# Patient Record
Sex: FEMALE | Race: WHITE | Employment: FULL TIME | URBAN - METROPOLITAN AREA
[De-identification: names, ages, dates, MRNs, and addresses within clinical notes are randomized per-mention and may not be internally consistent; named-entity substitution may affect disease eponyms.]

---

## 2020-12-15 ENCOUNTER — APPOINTMENT (OUTPATIENT)
Dept: CT IMAGING | Age: 65
DRG: 066 | End: 2020-12-15
Attending: EMERGENCY MEDICINE
Payer: MEDICARE

## 2020-12-15 ENCOUNTER — HOSPITAL ENCOUNTER (INPATIENT)
Age: 65
LOS: 1 days | Discharge: HOME | DRG: 066 | End: 2020-12-16
Attending: EMERGENCY MEDICINE | Admitting: INTERNAL MEDICINE
Payer: MEDICARE

## 2020-12-15 ENCOUNTER — APPOINTMENT (OUTPATIENT)
Dept: MRI IMAGING | Age: 65
DRG: 066 | End: 2020-12-15
Attending: INTERNAL MEDICINE
Payer: MEDICARE

## 2020-12-15 DIAGNOSIS — I10 ESSENTIAL HYPERTENSION: ICD-10-CM

## 2020-12-15 DIAGNOSIS — E78.5 HYPERLIPIDEMIA, UNSPECIFIED HYPERLIPIDEMIA TYPE: ICD-10-CM

## 2020-12-15 DIAGNOSIS — I63.9 CEREBROVASCULAR ACCIDENT (CVA), UNSPECIFIED MECHANISM (HCC): ICD-10-CM

## 2020-12-15 DIAGNOSIS — R20.0 LEFT SIDED NUMBNESS: ICD-10-CM

## 2020-12-15 DIAGNOSIS — I63.10 CEREBROVASCULAR ACCIDENT (CVA) DUE TO EMBOLISM OF PRECEREBRAL ARTERY (HCC): Primary | ICD-10-CM

## 2020-12-15 LAB
ALBUMIN SERPL-MCNC: 3.8 G/DL (ref 3.5–5)
ALBUMIN/GLOB SERPL: 1.1 {RATIO} (ref 1.1–2.2)
ALP SERPL-CCNC: 60 U/L (ref 45–117)
ALT SERPL-CCNC: 20 U/L (ref 12–78)
ANION GAP SERPL CALC-SCNC: 4 MMOL/L (ref 5–15)
APTT PPP: 24.8 SEC (ref 22.1–31)
AST SERPL-CCNC: 15 U/L (ref 15–37)
BASOPHILS # BLD: 0 K/UL (ref 0–0.1)
BASOPHILS NFR BLD: 0 % (ref 0–1)
BILIRUB SERPL-MCNC: 0.3 MG/DL (ref 0.2–1)
BUN SERPL-MCNC: 19 MG/DL (ref 6–20)
BUN/CREAT SERPL: 17 (ref 12–20)
CALCIUM SERPL-MCNC: 9 MG/DL (ref 8.5–10.1)
CHLORIDE SERPL-SCNC: 109 MMOL/L (ref 97–108)
CO2 SERPL-SCNC: 27 MMOL/L (ref 21–32)
COMMENT, HOLDF: NORMAL
CREAT SERPL-MCNC: 1.13 MG/DL (ref 0.55–1.02)
DIFFERENTIAL METHOD BLD: NORMAL
EOSINOPHIL # BLD: 0.1 K/UL (ref 0–0.4)
EOSINOPHIL NFR BLD: 1 % (ref 0–7)
ERYTHROCYTE [DISTWIDTH] IN BLOOD BY AUTOMATED COUNT: 12.8 % (ref 11.5–14.5)
GLOBULIN SER CALC-MCNC: 3.4 G/DL (ref 2–4)
GLUCOSE BLD STRIP.AUTO-MCNC: 131 MG/DL (ref 65–100)
GLUCOSE SERPL-MCNC: 133 MG/DL (ref 65–100)
HCT VFR BLD AUTO: 40.3 % (ref 35–47)
HGB BLD-MCNC: 13.2 G/DL (ref 11.5–16)
IMM GRANULOCYTES # BLD AUTO: 0 K/UL (ref 0–0.04)
IMM GRANULOCYTES NFR BLD AUTO: 0 % (ref 0–0.5)
INR PPP: 1 (ref 0.9–1.1)
LYMPHOCYTES # BLD: 1.9 K/UL (ref 0.8–3.5)
LYMPHOCYTES NFR BLD: 26 % (ref 12–49)
MCH RBC QN AUTO: 30.1 PG (ref 26–34)
MCHC RBC AUTO-ENTMCNC: 32.8 G/DL (ref 30–36.5)
MCV RBC AUTO: 91.8 FL (ref 80–99)
MONOCYTES # BLD: 0.6 K/UL (ref 0–1)
MONOCYTES NFR BLD: 7 % (ref 5–13)
NEUTS SEG # BLD: 4.9 K/UL (ref 1.8–8)
NEUTS SEG NFR BLD: 66 % (ref 32–75)
NRBC # BLD: 0 K/UL (ref 0–0.01)
NRBC BLD-RTO: 0 PER 100 WBC
PLATELET # BLD AUTO: 279 K/UL (ref 150–400)
PMV BLD AUTO: 9.4 FL (ref 8.9–12.9)
POTASSIUM SERPL-SCNC: 4 MMOL/L (ref 3.5–5.1)
PROT SERPL-MCNC: 7.2 G/DL (ref 6.4–8.2)
PROTHROMBIN TIME: 10.1 SEC (ref 9–11.1)
RBC # BLD AUTO: 4.39 M/UL (ref 3.8–5.2)
SAMPLES BEING HELD,HOLD: NORMAL
SERVICE CMNT-IMP: ABNORMAL
SODIUM SERPL-SCNC: 140 MMOL/L (ref 136–145)
THERAPEUTIC RANGE,PTTT: NORMAL SECS (ref 58–77)
TROPONIN I SERPL-MCNC: <0.05 NG/ML
WBC # BLD AUTO: 7.5 K/UL (ref 3.6–11)

## 2020-12-15 PROCEDURE — 70450 CT HEAD/BRAIN W/O DYE: CPT

## 2020-12-15 PROCEDURE — 74011250637 HC RX REV CODE- 250/637: Performed by: INTERNAL MEDICINE

## 2020-12-15 PROCEDURE — 74011250637 HC RX REV CODE- 250/637: Performed by: NURSE PRACTITIONER

## 2020-12-15 PROCEDURE — 85730 THROMBOPLASTIN TIME PARTIAL: CPT

## 2020-12-15 PROCEDURE — 70498 CT ANGIOGRAPHY NECK: CPT

## 2020-12-15 PROCEDURE — 93005 ELECTROCARDIOGRAM TRACING: CPT

## 2020-12-15 PROCEDURE — 70553 MRI BRAIN STEM W/O & W/DYE: CPT

## 2020-12-15 PROCEDURE — 99218 HC RM OBSERVATION: CPT

## 2020-12-15 PROCEDURE — 80053 COMPREHEN METABOLIC PANEL: CPT

## 2020-12-15 PROCEDURE — 0042T CT CODE NEURO PERF W CBF: CPT

## 2020-12-15 PROCEDURE — 85610 PROTHROMBIN TIME: CPT

## 2020-12-15 PROCEDURE — 82962 GLUCOSE BLOOD TEST: CPT

## 2020-12-15 PROCEDURE — 4A03X5D MEASUREMENT OF ARTERIAL FLOW, INTRACRANIAL, EXTERNAL APPROACH: ICD-10-PCS | Performed by: RADIOLOGY

## 2020-12-15 PROCEDURE — A9575 INJ GADOTERATE MEGLUMI 0.1ML: HCPCS | Performed by: INTERNAL MEDICINE

## 2020-12-15 PROCEDURE — 74011250636 HC RX REV CODE- 250/636: Performed by: INTERNAL MEDICINE

## 2020-12-15 PROCEDURE — 74011000636 HC RX REV CODE- 636: Performed by: RADIOLOGY

## 2020-12-15 PROCEDURE — 99285 EMERGENCY DEPT VISIT HI MDM: CPT

## 2020-12-15 PROCEDURE — 36415 COLL VENOUS BLD VENIPUNCTURE: CPT

## 2020-12-15 PROCEDURE — 85025 COMPLETE CBC W/AUTO DIFF WBC: CPT

## 2020-12-15 PROCEDURE — 84484 ASSAY OF TROPONIN QUANT: CPT

## 2020-12-15 RX ORDER — FLUTICASONE PROPIONATE 50 MCG
2 SPRAY, SUSPENSION (ML) NASAL DAILY
Status: DISCONTINUED | OUTPATIENT
Start: 2020-12-15 | End: 2020-12-16 | Stop reason: HOSPADM

## 2020-12-15 RX ORDER — ACETAMINOPHEN 650 MG/1
650 SUPPOSITORY RECTAL
Status: DISCONTINUED | OUTPATIENT
Start: 2020-12-15 | End: 2020-12-16 | Stop reason: HOSPADM

## 2020-12-15 RX ORDER — GADOTERATE MEGLUMINE 376.9 MG/ML
15 INJECTION INTRAVENOUS
Status: COMPLETED | OUTPATIENT
Start: 2020-12-15 | End: 2020-12-15

## 2020-12-15 RX ORDER — BISACODYL 5 MG
5 TABLET, DELAYED RELEASE (ENTERIC COATED) ORAL DAILY PRN
Status: DISCONTINUED | OUTPATIENT
Start: 2020-12-15 | End: 2020-12-16 | Stop reason: HOSPADM

## 2020-12-15 RX ORDER — ATORVASTATIN CALCIUM 40 MG/1
40 TABLET, FILM COATED ORAL
Status: DISCONTINUED | OUTPATIENT
Start: 2020-12-15 | End: 2020-12-16 | Stop reason: HOSPADM

## 2020-12-15 RX ORDER — GUAIFENESIN 100 MG/5ML
81 LIQUID (ML) ORAL DAILY
Status: DISCONTINUED | OUTPATIENT
Start: 2020-12-16 | End: 2020-12-16 | Stop reason: HOSPADM

## 2020-12-15 RX ORDER — ACETAMINOPHEN 325 MG/1
650 TABLET ORAL
Status: DISCONTINUED | OUTPATIENT
Start: 2020-12-15 | End: 2020-12-16 | Stop reason: HOSPADM

## 2020-12-15 RX ADMIN — GADOTERATE MEGLUMINE 15 ML: 376.9 INJECTION INTRAVENOUS at 19:48

## 2020-12-15 RX ADMIN — ATORVASTATIN CALCIUM 40 MG: 40 TABLET, FILM COATED ORAL at 22:13

## 2020-12-15 RX ADMIN — IOPAMIDOL 120 ML: 755 INJECTION, SOLUTION INTRAVENOUS at 14:19

## 2020-12-15 RX ADMIN — FLUTICASONE PROPIONATE 2 SPRAY: 50 SPRAY, METERED NASAL at 23:01

## 2020-12-15 NOTE — ED TRIAGE NOTES
Triage: pt arrives ambulatory from home with CC of numbness to her left arm, leg, and the left side of her face. Denies issues with vision or speech. Gait steady. Pt took an aspirin PTA.

## 2020-12-15 NOTE — PROGRESS NOTES
Neurocritical Care Code Stroke Documentation    Symptoms:   Numbness to left arm, leg, and the left side of her face. Denies issues with vision or speech. Reports leg and face appear to be getting better but arm is still numb,    Last Known Well:   36   Medical hx:   HTN   Anticoagulation:  None   VAN:   Negative   NIHSS:   1a-LOC:0    1b-Month/Age:0    1c-Open/Close Hand:0    2-Best Gaze:0    3-Visual Fields:0    4-Facial Palsy:0    5a-Left Arm:0    5b-Right Arm:0    6a-Left Le    6b-Right Le    7-Limb Ataxia:0    8-Sensory:2    9-Best Language:0    10-Dysarthria:0    11-Extinction/Inattention:0  TOTAL SCORE:2   Imaging:   CT head negative for acute process    CTA negative for LVO   Plan:   TPA Candidate: NO     Discussed with: Dr. Briseida Bustamante    Time spent: 30 minutes.      Marcelene Lombard, NP  Neurocritical Care Nurse Practitioner  [phone]

## 2020-12-15 NOTE — PROGRESS NOTES
Spiritual Care Assessment/Progress Note  [facility]      NAME: Reginald Palomares      MRN: [id number]  AGE: 76 y.o. SEX: female  Lutheran Affiliation: Pentecostal   Language: English     12/15/2020           Spiritual Assessment begun in [address] DEP through conversation with:         []Patient        [] Family    [] Friend(s)        Reason for Consult: Crisis     Spiritual beliefs: (Please include comment if needed)     [] Identifies with a samantha tradition:         [] Supported by a samantha community:            [] Claims no spiritual orientation:           [] Seeking spiritual identity:                [] Adheres to an individual form of spirituality:           [x] Not able to assess:                           Identified resources for coping:      [] Prayer                               [] Music                  [] Guided Imagery     [] Family/friends                 [] Pet visits     [] Devotional reading                         [] Unknown     [] Other:                                               Interventions offered during this visit: (See comments for more details)    Patient Interventions: Crisis           Plan of Care:     [] Support spiritual and/or cultural needs    [] Support AMD and/or advance care planning process      [] Support grieving process   [] Coordinate Rites and/or Rituals    [] Coordination with community clergy   [] No spiritual needs identified at this time   [] Detailed Plan of Care below (See Comments)  [] Make referral to Music Therapy  [] Make referral to Pet Therapy     [] Make referral to Addiction services  [] Make referral to [facility]  [] Make referral to Spiritual Care Partner  [] No future visits requested        [] Follow up upon further referrals     Comments: Responded to Code Stroke called for Ms Michelle Hand in ED-14. Tele Neuro was talking with patient and no family was present at time  arrived. Please notify chaplains if support desired/needed.   : Odilon Salomon.  Jennifer Matthew; [facility], to contact [phone] call: [phone]

## 2020-12-15 NOTE — PROGRESS NOTES
Spiritual Care Assessment/Progress Note  [facility]      NAME: Mary Jane Kevin      MRN: [id number]  AGE: 76 y.o.  SEX: female  U[place] Affiliation: Confucianist   Language: English     12/15/2020     Total Time (in minutes): 13     Spiritual Assessment begun in [address] through conversation with:         [x]Patient        [] Family    [] Friend(s)        Reason for Consult: Emergency Department visit     Spiritual beliefs: (Please include comment if needed)     [x] Identifies with a samantha tradition: [address]        [] Supported by a samantha community:            [] Claims no spiritual orientation:           [] Seeking spiritual identity:                [] Adheres to an individual form of spirituality:           [] Not able to assess:                           Identified resources for coping:      [x] Prayer                               [] Music                  [] Guided Imagery     [x] Family/friends                 [] Pet visits     [] Devotional reading                         [] Unknown     [] Other:                                               Interventions offered during this visit: (See comments for more details)    Patient Interventions: Affirmation of emotions/emotional suffering, Catharsis/review of pertinent events in supportive environment, Initial/Spiritual assessment, patient floor, Prayer (assurance of)           Plan of Care:     [x] Support spiritual and/or cultural needs    [] Support AMD and/or advance care planning process      [] Support grieving process   [] Coordinate Rites and/or Rituals    [] Coordination with community clergy   [] No spiritual needs identified at this time   [] Detailed Plan of Care below (See Comments)  [] Make referral to Music Therapy  [] Make referral to Pet Therapy     [] Make referral to Addiction services  [] Make referral to [facility]  [] Make referral to Spiritual Care Partner  [] No future visits requested        [x] Follow up upon further referrals     Comments: Visited Ms Alyssia Gomez in ED-14; patient is familiar to this  as she is an [facility] at the [employer] this  attends. Offered active listening as Jose Hamilton shared about the events that led up to her hospitalization. Provided safe space as she attempted to process her thoughts and feelings. Acknowledged her concerns and offered words of support. Jose Hamilton is an  at [employer] at [place] and very involved in [employer] Assured her of prayers on her behalf and of ongoing  availability for support. : Rev. Buckley Olszewski.  [employer]; [facility], to contact [phone] [address] call: [phone]

## 2020-12-15 NOTE — ROUTINE PROCESS
TRANSFER - OUT REPORT: 
 
[facility] [address]  being transferred to 361(unit) for routine progression of care Report consisted of patients Situation, Background, Assessment and  
Recommendations(SBAR). Information from the following report(s) SBAR, Kardex, ED Summary, Intake/Output and Recent Results was reviewed with the receiving nurse. Lines:  
Peripheral IV 12/15/20 Right Antecubital (Active) Site Assessment Clean, dry, & intact 12/15/20 1336 Phlebitis Assessment 0 12/15/20 1336 Infiltration Assessment 0 12/15/20 1336 Dressing Status Clean, dry, & intact 12/15/20 1336 Opportunity for questions and clarification was provided. Patient transported with: 
 [email or web address]

## 2020-12-15 NOTE — H&P
[address] [facility] Adult  Hospitalist Group  History and Physical    Primary Care Provider: Satish Enriquez MD  Date of Service:  12/15/2020    CC: left sided numbness     Subjective:     77-year-old female with past medical history hypertension, hyperlipidemia, presenting to [facility] with acute onset left-sided numbness. Per report, patient was at work when she had sudden onset numbness in the left side of her face, left elbow/arm. Denies weakness, facial droop, slurred speech. She took a baby aspirin. She presented to the emergency room department for further evaluation. During initial evaluation, her symptoms were resolving. Code stroke was initiated and patient was evaluated by teleneurology. CTA negative for acute large vessel occlusion or flow-limiting stenosis. CT head demonstrated small focus of diminished attenuation in right frontal lobe, consistent with acute versus chronic infarct. Also demonstrated chronic infarct in the inferior left cerebellar hemisphere. Hospitalist medicine was consulted for further evaluation/admission. During my encounter, patient symptoms had resolved. She was requesting possible discharge with return for outpatient MRI. Review of Systems:    A comprehensive review of systems was negative except for that written in the History of Present Illness. Past Medical History:   Diagnosis Date    Heart murmur     Hypercholesteremia     Hypertension       Denies surgical history   Prior to Admission medications    Medication Sig Start Date End Date Taking? Authorizing Provider   atorvastatin (LIPITOR) 10 mg tablet Take 10 mg by mouth daily. Other, MD Kelley     Allergies   Allergen Reactions    Erythromycin Rash    Pcn [Penicillins] Rash      Denies prior history of stroke     SOCIAL HISTORY:  Patient resides at home  Patient ambulates with independence.    Smoking history: denies  Alcohol history:occassional wine        Objective: Physical Exam:   Visit Vitals  BP (!) 167/85   Pulse 79   Temp 98.3 °F (36.8 °C)   Resp 22   Ht 5' 5\" (1.651 m)   Wt 71.9 kg (158 lb 8.2 oz)   SpO2 100%   BMI 26.38 kg/m²     General appearance: alert, cooperative, no distress, appears stated age  Head: Normocephalic, without obvious abnormality, atraumatic  Neck: supple, symmetrical, trachea midline, no adenopathy  Lungs: clear to auscultation bilaterally, no increased work of breathing   Heart: regular rate and rhythm, S1, S2 normal, no murmur, click, rub or gallop  Abdomen: soft, non-tender. Bowel sounds normal. No masses,  no organomegaly  Extremities: extremities normal, atraumatic, no cyanosis or edema  Pulses: 2+ and symmetric  Skin: Skin color, texture, turgor normal. No rashes or lesions  Neurologic: Speech clear  Cap refill: Brisk, less than 3 seconds  Pulses: 2+, symmetric in all extremities    ECG: normal sinus rhythm     Data Review: All diagnostic labs and studies have been reviewed. CT head:  IMPRESSION:   1. Small focus of diminished attenuation peripherally demonstrated in the  anterosuperior right frontal lobe of consistent with acute versus chronic  infarct. 2. 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 CTA head:  IMPRESSION:   Negative CT angiography of the neck and head. No acute large vessel occlusion or  flow-limiting stenosis.       Assessment:     Active Problems:    Left sided numbness (12/15/2020)        Plan:     Left sided numbness, TIA vs CVA:   -presented with sudden onset facial, left shoulder/arm numbness.  Resolving upon ED evaluation   -Evaluated by teleneurology in the ED, recommend further work up  -CT head concerning for acute vs chronic infarct in the right frontal lobe  -MRI brain ordered  -check lipid panel, Ha1c  -start high intensity statin  -order echo pending MRI results  -neurology consulted  -continue aspirin    Hyperlipidemia:  -Check lipid panel, continue statin    Hypertension:   -allow permissive    DVT:  Ambulatory  Code: full     Discussed with patient importance of remaining in hospital to rule out acute CVA. I discussed concerns for stroke with neurological symptoms and CT head findings. I would not be able to order MRI as an outpatient.        Zaira Alvarez DO         Signed By: [id number] [facility], DO     December 15, 2020

## 2020-12-15 NOTE — ED NOTES
Pt brought to room 14 after CT scan. Assumed care. Numbness in L face, shoulder, upper arm around 11:30 am, now improving. Teleneurologist Rojelio with patient.

## 2020-12-15 NOTE — ED PROVIDER NOTES
This is a 39-year-old female with a history of elevated lipids and she said is a little transient episode of history with hypertension although she is on no medications for the same. She has had no fever or chills, nausea or vomiting, headache, dizziness or visual issues. She does not have any shortness of breath or chest pain no abdominal pain or other GI or  symptoms. She states that she was at work in a meeting at about 10:45 AM and developed some numbness in the left side of her face including her forehead and cheek. The numbness extended down onto the shoulder on the left as well as into the upper arm on the left. She had no numbness or tingling elsewhere. She denies any focal weakness anywhere and has had no difficulty with speech or getting around ambulating. She is on no blood thinners and has had no recent injury to her head. There has been no tinnitus or other acute symptoms leading up to this event. She was feeling fine until this all started some 3 hours prior to this admission. Symptoms are improving. Past Medical History:   Diagnosis Date    Heart murmur     Hypercholesteremia        No past surgical history on file. No family history on file.     Social History     Socioeconomic History    Marital status: SINGLE     Spouse name: Not on file    Number of children: Not on file    Years of education: Not on file    Highest education level: Not on file   Occupational History    Not on file   Social Needs    Financial resource strain: Not on file    Food insecurity     Worry: Not on file     Inability: Not on file    Transportation needs     Medical: Not on file     Non-medical: Not on file   Tobacco Use    Smoking status: Not on file   Substance and Sexual Activity    Alcohol use: Not on file    Drug use: Not on file    Sexual activity: Not on file   Lifestyle    Physical activity     Days per week: Not on file     Minutes per session: Not on file    Stress: Not on file   Relationships    Social connections     Talks on phone: Not on file     Gets together: Not on file     Attends Jehovah's witness service: Not on file     Active member of club or organization: Not on file     Attends meetings of clubs or organizations: Not on file     Relationship status: Not on file    Intimate partner violence     Fear of current or ex partner: Not on file     Emotionally abused: Not on file     Physically abused: Not on file     Forced sexual activity: Not on file   Other Topics Concern    Not on file   Social History Narrative    Not on file         ALLERGIES: Erythromycin and Pcn [penicillins]    Review of Systems   Constitutional: Negative for activity change, appetite change, chills, fatigue and fever. HENT: Negative for ear pain, facial swelling, sore throat and trouble swallowing. Eyes: Negative for pain, discharge and visual disturbance. Respiratory: Negative for chest tightness, shortness of breath and wheezing. Cardiovascular: Negative for chest pain and palpitations. Gastrointestinal: Negative for abdominal pain, blood in stool, nausea and vomiting. Genitourinary: Negative for difficulty urinating, flank pain and hematuria. Musculoskeletal: Negative for myalgias and neck pain. Skin: Negative for color change and rash. Neurological: Positive for syncope and numbness ( See HPI). Negative for dizziness, weakness and headaches. Hematological: Does not bruise/bleed easily. Psychiatric/Behavioral: Negative for behavioral problems and confusion. All other systems reviewed and are negative. Vitals:    12/15/20 1318   BP: (!) 170/86   Pulse: 87   Resp: 16   Temp: 98.1 °F (36.7 °C)   SpO2: 100%   Weight: 71.9 kg (158 lb 8.2 oz)   Height: 5' 5\" (1.651 m)            Physical Exam  Vitals signs and nursing note reviewed. Constitutional:       General: She is not in acute distress. Appearance: She is well-developed.    HENT:      Head: Normocephalic and atraumatic. Nose: Nose normal.   Eyes:      General: No scleral icterus. Conjunctiva/sclera: Conjunctivae normal.      Pupils: Pupils are equal, round, and reactive to light. Neck:      Musculoskeletal: Normal range of motion and neck supple. Thyroid: No thyromegaly. Vascular: No JVD. Trachea: No tracheal deviation. Comments: No carotid bruits noted. Cardiovascular:      Rate and Rhythm: Normal rate and regular rhythm. Heart sounds: Normal heart sounds. No murmur. No friction rub. No gallop. Pulmonary:      Effort: Pulmonary effort is normal. No respiratory distress. Breath sounds: Normal breath sounds. No wheezing or rales. Chest:      Chest wall: No tenderness. Abdominal:      General: Bowel sounds are normal. There is no distension. Palpations: Abdomen is soft. There is no mass. Tenderness: There is no abdominal tenderness. There is no guarding or rebound. Musculoskeletal: Normal range of motion. General: No tenderness. Lymphadenopathy:      Cervical: No cervical adenopathy. Skin:     General: Skin is warm and dry. Findings: No erythema or rash. Neurological:      Mental Status: She is alert and oriented to person, place, and time. Cranial Nerves: Cranial nerve deficit present. Sensory: Sensory deficit present. Motor: No weakness. Coordination: Coordination normal.      Deep Tendon Reflexes: Reflexes are normal and symmetric. Comments: There is a mild degree of paresthesia in the left forehead and face. Similar symptoms over the dorsum of the shoulder on the left. There is no numbness or tingling elsewhere. She has no focal motor weakness, and there is no visual acuity or field defect noted. Psychiatric:         Behavior: Behavior normal.         Thought Content:  Thought content normal.         Judgment: Judgment normal.          MDM  Number of Diagnoses or Management Options  TIA (transient ischemic attack): new and requires workup     Amount and/or Complexity of Data Reviewed  Clinical lab tests: ordered and reviewed  Tests in the radiology section of CPT®: ordered and reviewed  Decide to obtain previous medical records or to obtain history from someone other than the patient: yes  Review and summarize past medical records: yes  Discuss the patient with other providers: yes  Independent visualization of images, tracings, or specimens: yes    Risk of Complications, Morbidity, and/or Mortality  Presenting problems: high  Diagnostic procedures: high  Management options: high    Patient Progress  Patient progress: stable         Procedures    A code stroke level 1 was called on this patient in triage. She was met in the CT scanner by myself. History and physical were obtained. Patient did have localized numbness and paresthesia in the left forehead and left face the dorsum of the left shoulder. She states that all of her symptoms are improving significantly. There is some residual numbness as noted above. Vital signs are as noted. She is not on any anticoagulants. I have consulted with the telemetry neurologist and he is currently seeing the patient on the monitor. ED MD EKG interpretation: Normal sinus rhythm with a rate at 80 beats a minute. There is left axis shift. No ectopy or acute ischemic changes noted. Rosemarie Ozuna MD    I discussed all findings with the patient including her plain CT. We are still awaiting the CTA, however, she has changes in her unenhanced scan to suggest past cerebral disease. With her current symptomatology, there is heightened concern about TIA. She would like to speak with the admitting physician before she agrees to admission. She understands the risk and the benefits. He is clearly aware from both the neurologist and myself of the concern for ischemic cerebral disease and the need for further evaluation and treatment.     Perfect Serve Consult for Admission  3:22 PM    ED Room Number: JW70/74  Patient Name and age:  Juwan Sheriff 76 y.o.  female  Working Diagnosis:   1. TIA (transient ischemic attack)        COVID-19 Suspicion:  no  Sepsis present:  no  Reassessment needed: no  Code Status:  Full Code  Readmission: no  Isolation Requirements:  no  Recommended Level of Care:  telemetry  Department:[facility] Adult ED - 21   Other:  Patient would like to speak with hospitalist prior to admission    Folllowing the CTA it appears the patien thas some acute changes on the imaging to suggest more of an acute process. Have discussed with the hospitalist to admit for further evaluation of probable cva    It is noted patient is improving clinically in the ED.

## 2020-12-16 ENCOUNTER — APPOINTMENT (OUTPATIENT)
Dept: NON INVASIVE DIAGNOSTICS | Age: 65
DRG: 066 | End: 2020-12-16
Attending: INTERNAL MEDICINE
Payer: MEDICARE

## 2020-12-16 VITALS
RESPIRATION RATE: 16 BRPM | OXYGEN SATURATION: 96 % | WEIGHT: 177 LBS | BODY MASS INDEX: 29.49 KG/M2 | HEIGHT: 65 IN | HEART RATE: 82 BPM | SYSTOLIC BLOOD PRESSURE: 140 MMHG | TEMPERATURE: 98 F | DIASTOLIC BLOOD PRESSURE: 79 MMHG

## 2020-12-16 PROBLEM — I63.9 CVA (CEREBRAL VASCULAR ACCIDENT) (HCC): Status: ACTIVE | Noted: 2020-12-16

## 2020-12-16 LAB
ATRIAL RATE: 80 BPM
CALCULATED P AXIS, ECG09: 40 DEGREES
CALCULATED R AXIS, ECG10: 4 DEGREES
CALCULATED T AXIS, ECG11: 37 DEGREES
CHOLEST SERPL-MCNC: 176 MG/DL
DIAGNOSIS, 93000: NORMAL
EST. AVERAGE GLUCOSE BLD GHB EST-MCNC: 120 MG/DL
HBA1C MFR BLD: 5.8 % (ref 4–5.6)
HDLC SERPL-MCNC: 49 MG/DL
HDLC SERPL: 3.6 {RATIO} (ref 0–5)
LDLC SERPL CALC-MCNC: 96 MG/DL (ref 0–100)
LIPID PROFILE,FLP: ABNORMAL
P-R INTERVAL, ECG05: 156 MS
Q-T INTERVAL, ECG07: 368 MS
QRS DURATION, ECG06: 76 MS
QTC CALCULATION (BEZET), ECG08: 424 MS
TRIGL SERPL-MCNC: 155 MG/DL (ref ?–150)
VENTRICULAR RATE, ECG03: 80 BPM
VLDLC SERPL CALC-MCNC: 31 MG/DL

## 2020-12-16 PROCEDURE — 80061 LIPID PANEL: CPT

## 2020-12-16 PROCEDURE — 65660000000 HC RM CCU STEPDOWN

## 2020-12-16 PROCEDURE — 83036 HEMOGLOBIN GLYCOSYLATED A1C: CPT

## 2020-12-16 PROCEDURE — 74011250637 HC RX REV CODE- 250/637: Performed by: INTERNAL MEDICINE

## 2020-12-16 PROCEDURE — 93306 TTE W/DOPPLER COMPLETE: CPT

## 2020-12-16 PROCEDURE — 99223 1ST HOSP IP/OBS HIGH 75: CPT | Performed by: PSYCHIATRY & NEUROLOGY

## 2020-12-16 PROCEDURE — 36415 COLL VENOUS BLD VENIPUNCTURE: CPT

## 2020-12-16 PROCEDURE — 99223 1ST HOSP IP/OBS HIGH 75: CPT | Performed by: INTERNAL MEDICINE

## 2020-12-16 PROCEDURE — 99218 HC RM OBSERVATION: CPT

## 2020-12-16 RX ORDER — ATORVASTATIN CALCIUM 40 MG/1
40 TABLET, FILM COATED ORAL
Qty: 30 TAB | Refills: 0 | Status: SHIPPED | OUTPATIENT
Start: 2020-12-16

## 2020-12-16 RX ORDER — GUAIFENESIN 100 MG/5ML
81 LIQUID (ML) ORAL DAILY
Qty: 30 TAB | Refills: 0 | Status: SHIPPED | OUTPATIENT
Start: 2020-12-16

## 2020-12-16 RX ADMIN — ASPIRIN 81 MG: 81 TABLET, CHEWABLE ORAL at 09:11

## 2020-12-16 NOTE — PROGRESS NOTES
I have reviewed discharge instructions with the patient. The patient verbalized understanding.  Mickey Banks does not worked

## 2020-12-16 NOTE — PROGRESS NOTES
TRANSITIONS OF CARE PLAN:   1. DESTINATION: Likely own home  2. TRANSPORT: A Friend    3. ADDITIONAL SUPPORT: Good network of friends  4. DME: None   5. HOME HEALTH: TBD     6. CODE STATUS/AMD STATUS: Full Code - not on file; declined AMD/ACP     7. FOLLOW UP APPOINTMENTS: PCP, Cardio, Neuro  8. STILL NEEDS: Echo, Cardio Consult, DRE, likely PT and OT Consults due to CVA dx    Reason for Admission:   Left Sided Numbness, CVA                   RUR Score:          7%           Plan for utilizing home health:      TBD    PCP: First and Last name:  Dr Michelle Vincent   Name of Practice:    Are you a current patient: Yes/No: yes   Approximate date of last visit: 3 months   Can you participate in a virtual visit with your PCP: yes                    Current Advanced Directive/Advance Care Plan: Full Code; not on file - declined ACP/AMD                         Transition of Care Plan:   CM met with patient, with patient alert and oriented x4. Patient lives alone in single story home with 3 exterior steps and is fully independent in adls, to include driving. Patient has no hx of HH or Rehab; pharmacy preference [facility]. Patient declined completion of AMD or ACP. Likely disposition is for discharge to own home with transport via a friend. Medicare pt has received, reviewed, and signed first IM letter informing them of their right to appeal the discharge. Signed copy has been placed on pt bedside chart. Care Management Interventions  PCP Verified by CM: Yes(last seen by Dr. Diana Javier 3 months ago)  Palliative Care Criteria Met (RRAT>21 & CHF Dx)?: No  Mode of Transport at Discharge:  Other (see comment)(friend to transport)  Transition of Care Consult (CM Consult): Discharge Planning  MyChart Signup: No  Discharge Durable Medical Equipment: No(none)  Health Maintenance Reviewed: Yes(cm met with patient, with patient alert and oriented x4)  Physical Therapy Consult: No  Occupational Therapy Consult: No  Speech Therapy Consult: No  Current Support Network: Own Home, Lives Alone  Confirm Follow Up Transport: Self(independent in adls, to include driving and living alone)  [employer] Provided?: No  Discharge Location  Discharge Placement: Unable to determine at this time(lives alone in a single story home, 3 exterior steps)  CRM: Willi Julian, MPH, [address] [place] [address]; Z: [phone]

## 2020-12-16 NOTE — CONSULTS
Cardiac Electrophysiology Hospital Consultation Note   REFERRING PROVIDER: Dr Jose Carlos Castillo  Subjective:      Glenny Browning is a 76 y.o. patient who is seen for evaluation of cryptogenic stroke  The patient presented to the hospital with left-sided numbness  Brain MRI on December 15, 2020 show acute infarcts in the bilateral postcentral gyri in the right posterior parietal lobe, presumably embolic in origin.   She also had chronic infarct in the right frontal lobe right parietal lobe and bilateral cerebellum greater on the left than on the right  2D echocardiogram December 16, 2020 reported normal left ventricular function and no shunt  Twelve-lead EKG showed normal sinus rhythm   CBC is normal  LDL 96, HDL 49  Cardiac enzymes troponin less than 0.05    She has a past medical history of hypertension  There is no family history of stroke  Patient Active Problem List   Diagnosis Code    Left sided numbness R20.0    CVA (cerebral vascular accident) ([facility] [address] I63.9     Current Facility-Administered Medications   Medication Dose Route Frequency Provider Last Rate Last Admin    acetaminophen (TYLENOL) tablet 650 mg  650 mg Oral Q4H PRN Sahara Deacon M, DO        Or    acetaminophen (TYLENOL) solution 650 mg  650 mg Per NG tube Q4H PRN Sahara Deacon M, DO        Or    acetaminophen (TYLENOL) suppository 650 mg  650 mg Rectal Q4H PRN Sahara Deacon M, DO        aspirin chewable tablet 81 mg  81 mg Oral DAILY Sahara Deacon M, DO   81 mg at 12/16/20 2672    atorvastatin (LIPITOR) tablet 40 mg  40 mg Oral QHS Nupur Corona M, DO   40 mg at 12/15/20 2213    bisacodyL (DULCOLAX) tablet 5 mg  5 mg Oral DAILY PRN Sahara Deacon M, DO        fluticasone propionate (FLONASE) 50 mcg/actuation nasal spray 2 Spray  2 Spray Both Nostrils DAILY Aníbal Smalls NP   2 Spray at 12/15/20 2301     Allergies   Allergen Reactions    Erythromycin Rash    Pcn [Penicillins] Rash     Past Medical History:   Diagnosis Date    Heart murmur     Hypercholesteremia     Hypertension      No past surgical history     Social History     Tobacco Use    Smoking status: Not on file   Substance Use Topics    Alcohol use: Not on file        Review of Systems:   Constitutional: Negative for fever, chills, weight loss, malaise/fatigue. HEENT: Negative for nosebleeds, vision changes. Respiratory: Negative for cough, hemoptysis  Cardiovascular: Negative for chest pain, palpitations, orthopnea, claudication, leg swelling, syncope, and PND. Gastrointestinal: Negative for nausea, vomiting, diarrhea, blood in stool and melena. Genitourinary: Negative for dysuria, and hematuria. Musculoskeletal: Negative for myalgias, arthralgia. Skin: Negative for rash. Heme: Does not bleed or bruise easily. Neurological: Negative for speech change and focal weakness     Objective:     Visit Vitals  BP (!) 140/79   Pulse 82   Temp 98 °F (36.7 °C)   Resp 16   Ht 5' 5\" (1.651 m)   Wt 177 lb (80.3 kg)   SpO2 96%   BMI 29.45 kg/m²      Physical Exam:   Constitutional: well-developed and well-nourished. No respiratory distress. Head: Normocephalic and atraumatic. Eyes: Pupils are equal, round  ENT: hearing normal  Neck: supple. No JVD present. Cardiovascular: Normal rate, regular rhythm. Exam reveals no gallop and no friction rub. No murmur heard. Pulmonary/Chest: Effort normal and breath sounds normal. No wheezes. Abdominal: Soft, no tenderness. Musculoskeletal: no edema. Neurological: alert,oriented. Skin: Skin is warm and dry  Psychiatric: normal mood and affect.  Behavior is normal. Judgment and thought content normal.        Assessment/Plan:     The patient had chronic stroke and acute stroke and in multiple different vascular territories consistent with embolism  She has a history of hypertension but this does not explain the mechanism of the embolic stroke   she may have Paroxysmal atrial fibrillation  I have explained to her the difference between the external 30 Day Loop monitor and the implantable loop recorder and I recommended the implantable loop recorder but she want to take more time to think about it and talk to her family before calling my office to set this up. I gave the patient my office contact number and have discussed with Dr. Shannon Mrashall    Thank you for involving me in this patient's care and please call with further concerns or questions. Ayde Mir M.D.   Electrophysiology/Cardiology  [facility]  [address] [place] [address]                                [phone]

## 2020-12-16 NOTE — DISCHARGE INSTRUCTIONS
Discharge Instructions       PATIENT ID: Katelynn Prakash  MRN: [id number]   YOB: 1955    DATE OF ADMISSION: 12/15/2020  1:23 PM    DATE OF DISCHARGE: 12/15/2020    PRIMARY CARE PROVIDER: Rubi Mayfield MD     ATTENDING PHYSICIAN: Jackie Wesley DO  DISCHARGING PROVIDER: Mahamed Prado DO    To contact this individual call [phone] and ask the  to page. If unavailable ask to be transferred the Adult Hospitalist Department. DISCHARGE DIAGNOSES     Stroke    CONSULTATIONS: IP CONSULT TO NEUROLOGY    PROCEDURES/SURGERIES: * No surgery found *    PENDING TEST RESULTS:   At the time of discharge the following test results are still pending: none    FOLLOW UP APPOINTMENTS:   Follow-up Information     Follow up With Specialties Details Why Anh Hernandez MD [address] [facility]:     Take aspirin 81 mg daily and atorvastatin 40 mg daily. Please call your primary care physician to discuss hospitalization. You will need to follow up with Dr. Gretchen Tapia for either Holter monitor or Loop recorder. Return to the hospital for any new symptoms. DISCHARGE MEDICATIONS:   See Medication Reconciliation Form    · It is important that you take the medication exactly as they are prescribed. · Keep your medication in the bottles provided by the pharmacist and keep a list of the medication names, dosages, and times to be taken in your wallet. · Do not take other medications without consulting your doctor. NOTIFY YOUR PHYSICIAN FOR ANY OF THE FOLLOWING:   Fever over 101 degrees for 24 hours. Chest pain, shortness of breath, fever, chills, nausea, vomiting, diarrhea, change in mentation, falling, weakness, bleeding. Severe pain or pain not relieved by medications. Or, any other signs or symptoms that you may have questions about.           Signed:   Parul Robb Amanda Benítez, DO  12/15/2020  6:31 PM    Patient Education        Stroke: Care Instructions  Your Care Instructions     You have had a stroke. This means that the blood flow to a part of your brain was blocked for some time, which damages the nerve cells in that part of the brain. The part of your body controlled by that part of your brain may not function properly now. The brain is an amazing organ that can heal itself to some degree. The stroke you had damaged part of your brain. But other parts of your brain may take over in some way for the damaged areas. You have already started this process. Your doctor will talk with you about what you can do to prevent another stroke. High blood pressure, high cholesterol, and diabetes are all risk factors for stroke. If you have any of these conditions, work with your doctor to make sure they are under control. Other risk factors for stroke include being overweight, smoking, and not getting regular exercise. Going home may be hard for you and your family. The more you can try to do for yourself, the better. Remember to take each day one at a time. Follow-up care is a key part of your treatment and safety. Be sure to make and go to all appointments, and call your doctor if you are having problems. It's also a good idea to know your test results and keep a list of the medicines you take. How can you care for yourself at home?    · Enter a stroke rehabilitation (rehab) program, if your doctor recommends it. Physical, speech, and occupational therapies can help you manage bathing, dressing, eating, and other basics of daily living.     · Do not drive until your doctor says it is okay.     · It is normal to feel sad or depressed after a stroke. If these feelings last, talk to your doctor.     · If you are having problems with urine leakage, go to the bathroom at regular times, including when you first wake up and at bedtime.  Also, limit fluids after dinner.     · If you are constipated, drink plenty of fluids, enough so that your urine is light yellow or clear like water. If you have kidney, heart, or liver disease and have to limit fluids, talk with your doctor before you increase the amount of fluids you drink. Set up a regular time for using the toilet. If you continue to have constipation, your doctor may suggest using a bulking agent, such as Metamucil, or a stool softener, laxative, or enema. Medicines    · Take your medicines exactly as prescribed. Call your doctor if you think you are having a problem with your medicine. You may be taking several medicines. ACE (angiotensin-converting enzyme) inhibitors, angiotensin II receptor blockers (ARBs), beta-blockers, diuretics (water pills), and calcium channel blockers control your blood pressure. Statins help lower cholesterol. Your doctor may also prescribe medicines for depression, pain, sleep problems, anxiety, or agitation.     · If your doctor has given you a blood thinner to prevent another stroke, be sure you get instructions about how to take your medicine safely. Blood thinners can cause serious bleeding problems.     · Do not take any over-the-counter medicines or herbal products without talking to your doctor first.     · If you take birth control pills or hormone therapy, talk to your doctor about whether they are right for you. For family members and caregivers    · Make the home safe. Set up a room so that your loved one does not have to climb stairs. Be sure the bathroom is on the same floor. Move throw rugs and furniture that could cause falls. Make sure that the lighting is good. Put grab bars and seats in tubs and showers.     · Find out what your loved one can do and what he or she needs help with. Try not to do things for your loved one that your loved one can do on his or her own. Help him or her learn and practice new skills.     · Visit and talk with your loved one often.  Try doing activities together that you both enjoy, such as playing cards or board games. Keep in touch with your loved one's friends as much as you can. Encourage them to visit.     · Take care of yourself. Do not try to do everything yourself. Ask other family members to help. Eat well, get enough rest, and take time to do things that you enjoy. Keep up with your own doctor visits, and make sure to take your medicines regularly. Get out of the house as much as you can. Join a local support group. Find out if you qualify for home health care visits to help with rehab or for adult day care. When should you call for help? Call 911 anytime you think you may need emergency care. For example, call if:    · You have signs of another stroke. These may include:  ? Sudden numbness, tingling, weakness, or loss of movement in your face, arm, or leg, especially on only one side of your body. ? Sudden vision changes. ? Sudden trouble speaking. ? Sudden confusion or trouble understanding simple statements. ? Sudden problems with walking or balance. ? A sudden, severe headache that is different from past headaches. Call 911 even if these symptoms go away in a few minutes. Call your doctor now or seek immediate medical care if:    · You have new symptoms that may be related to your stroke, such as falls or trouble swallowing. Watch closely for changes in your health, and be sure to contact your doctor if you have any problems. Where can you learn more? Go to http://www.gray.com/  Enter C294 in the search box to learn more about \"Stroke: Care Instructions. \"  Current as of: March 4, 2020               Content Version: 12.6  © 6298-4229 [employer]. Care instructions adapted under license by [employer] (which disclaims liability or warranty for this information).  If you have questions about a medical condition or this instruction, always ask your healthcare professional. Yony Mello disclaims any warranty or liability for your use of this information.

## 2020-12-16 NOTE — PROGRESS NOTES
[address] [facility] Adult  Hospitalist Group                                                                                          Hospitalist Progress Note  [address] [facility],   Answering service: [phone] OR [phone] from in house phone        Date of Service:  2020  NAME:  Kayla Sierra  :  1955  MRN:  [id number]      Admission Summary:   59-year-old female with past medical history hypertension, hyperlipidemia, presenting to [facility] with acute onset left-sided numbness. Per report, patient was at work when she had sudden onset numbness in the left side of her face, left elbow/arm. Denies weakness, facial droop, slurred speech. She took a baby aspirin. She presented to the emergency room department for further evaluation. During initial evaluation, her symptoms were resolving. Code stroke was initiated and patient was evaluated by teleneurology. CTA negative for acute large vessel occlusion or flow-limiting stenosis. CT head demonstrated small focus of diminished attenuation in right frontal lobe, consistent with acute versus chronic infarct. Also demonstrated chronic infarct in the inferior left cerebellar hemisphere. Hospitalist medicine was consulted for further evaluation/admission.        Interval history / Subjective: Follow up CVA. Patient seen and examined. Reports no return of numbness. States she is hungry. MRI brain consistent with tiny acute infarcts in bilateral postcentral gyri and right posterior parietal lobe, suspected embolic distribution. Discussed with patient. Assessment & Plan:     Acute CVA:  -presented with sudden onset facial, left shoulder/arm numbness.  Resolving upon ED evaluation   -CT head concerning for acute vs chronic infarct in the right frontal lobe  -MRI brain tiny acute infarcts in bilateral postcentral gyri and right posterior parietal lobe, suspected embolic distribution  -Neurology consulted  -Echocardiogram pending  -LDL 96, continue atorvastatin 40 mg   -Ha1c 5.8  -continue aspirin  -telemetry reviewed and NSR. ?need anticoagulation with possible embolic source vs cardiology consult- will defer to neurology      Hyperlipidemia:  -high intensity statin      Hypertension:   -allow permissive    Code status: full   DVT prophylaxis: ambulatory     Care Plan discussed with: Patient/Family  Anticipated Disposition: Home w/Family  Anticipated Discharge: 24 hours to 48 hours     Hospital Problems  Never Reviewed          Codes Class Noted POA    CVA (cerebral vascular accident) ([facility] [address] ICD-10-CM: I63.9  ICD-9-CM: 434.91  12/16/2020 Unknown        Left sided numbness ICD-10-CM: R20.0  ICD-9-CM: 782.0  12/15/2020 Unknown                Review of Systems:   Negative unless stated above      Vital Signs:    Last 24hrs VS reviewed since prior progress note. Most recent are:  Visit Vitals  /89   Pulse 80   Temp 97.8 °F (36.6 °C)   Resp 16   Ht 5' 5\" (1.651 m)   Wt 80.6 kg (177 lb 11.1 oz)   SpO2 97%   BMI 29.57 kg/m²       No intake or output data in the 24 hours ending 12/16/20 0935     Physical Examination:     I had a face to face encounter with this patient and independently examined them on 12/16/2020 as outlined below:          Constitutional:  No acute distress, cooperative, pleasant    ENT:  Oral mucosa moist, oropharynx benign. Resp:  CTA bilaterally. No wheezing/rhonchi/rales. No accessory muscle use   CV:  Regular rhythm, normal rate, no murmurs, gallops, rubs    GI:  Soft, non distended, non tender. normoactive bowel sounds, no hepatosplenomegaly     Musculoskeletal:  No edema, warm, 2+ pulses throughout    Neurologic:  Moves all extremities.   AAOx3, speech clear, strength intact           Data Review:    Review and/or order of clinical lab test  Review and/or order of tests in the radiology section of CPT  Review and/or order of tests in the medicine section of CPT      Labs:     Recent Labs     12/15/20  1338   WBC 7.5   HGB 13.2   HCT 40.3        Recent Labs     12/15/20  1338      K 4.0   *   CO2 27   BUN 19   CREA 1.13*   *   CA 9.0     Recent Labs     12/15/20  1338   ALT 20   AP 60   TBILI 0.3   TP 7.2   ALB 3.8   GLOB 3.4     Recent Labs     12/15/20  1338   INR 1.0   PTP 10.1   APTT 24.8      No results for input(s): FE, TIBC, PSAT, FERR in the last 72 hours. No results found for: FOL, RBCF   No results for input(s): PH, PCO2, PO2 in the last 72 hours.   Recent Labs     12/15/20  1338   TROIQ <0.05     Lab Results   Component Value Date/Time    Cholesterol, total 176 12/16/2020 03:47 AM    HDL Cholesterol 49 12/16/2020 03:47 AM    LDL, calculated 96 12/16/2020 03:47 AM    Triglyceride 155 (H) 12/16/2020 03:47 AM    CHOL/HDL Ratio 3.6 12/16/2020 03:47 AM     Lab Results   Component Value Date/Time    Glucose (POC) 131 (H) 12/15/2020 01:24 PM     No results found for: COLOR, APPRN, SPGRU, REFSG, GIFTY, PROTU, GLUCU, KETU, BILU, UROU, LESLEE, LEUKU, GLUKE, EPSU, BACTU, WBCU, RBCU, CASTS, UCRY      Medications Reviewed:     Current Facility-Administered Medications   Medication Dose Route Frequency    acetaminophen (TYLENOL) tablet 650 mg  650 mg Oral Q4H PRN    Or    acetaminophen (TYLENOL) solution 650 mg  650 mg Per NG tube Q4H PRN    Or    acetaminophen (TYLENOL) suppository 650 mg  650 mg Rectal Q4H PRN    aspirin chewable tablet 81 mg  81 mg Oral DAILY    atorvastatin (LIPITOR) tablet 40 mg  40 mg Oral QHS    bisacodyL (DULCOLAX) tablet 5 mg  5 mg Oral DAILY PRN    fluticasone propionate (FLONASE) 50 mcg/actuation nasal spray 2 Spray  2 Spray Both Nostrils DAILY     ______________________________________________________________________  EXPECTED LENGTH OF STAY: - - -  ACTUAL LENGTH OF STAY:          0                 Nupur Gill DO

## 2020-12-16 NOTE — DISCHARGE SUMMARY
Discharge Summary       PATIENT ID: Anna Marie Martinez  MRN: [id number]   YOB: 1955    DATE OF ADMISSION: 12/15/2020  1:23 PM    DATE OF DISCHARGE:12/16/2020  PRIMARY CARE PROVIDER: Yony Smith MD     ATTENDING PHYSICIAN: Rubina Sharma DO  DISCHARGING PROVIDER: Rubina Sharma DO    To contact this individual call [phone] and ask the  to page. If unavailable ask to be transferred the Adult Hospitalist Department. CONSULTATIONS: IP CONSULT TO NEUROLOGY  IP CONSULT TO ELECTROPHYSIOLOGY    PROCEDURES/SURGERIES: * No surgery found *    ADMITTING DIAGNOSES & HOSPITAL COURSE:   59-year-old female with PMH HTN, HLD, presenting to [facility] with acute onset left-sided numbness. Patient was at work when she had sudden onset numbness in her left face, left elbow/arm. Denied weakness, facial droop, slurred speech. In the emergency department, patient's symptoms were resolving. Code stroke was initiated and she was evaluated by teleneurology. CTA was negative for acute large vessel occlusion or flow-limiting stenosis. CT head demonstrated small focus of diminished attenuation in the right frontal lobe, consistent with acute versus chronic infarct. She was admitted for further evaluation. MRI brain demonstrated tiny acute infarcts, consistent with embolic distribution. Neurology consulted and recommended continuing aspirin and high intensity statin. Cardiology electrophysiologist was consulted for possible undetected atrial fibrillation in setting of suspected embolic source. It was recommended that patient have either loop recorder or Holter monitor. Patient elected to further discuss with her primary care provider before making decision. She was given information for outpatient follow-up with EP. She was stable to discharge home. Patient voiced understanding and agreement to plan.        MRI brain with and without contrast:  IMPRESSION:   1.  Tiny acute infarcts in the bilateral postcentral gyri and right posterior  parietal lobe, presumably embolic given distribution. 2. Small chronic infarcts in the right frontal lobe, right parietal lobe, and  bilateral cerebellum, left greater than right. DISCHARGE DIAGNOSES / PLAN:      Acute CVA:  -presented with sudden onset facial, left shoulder/arm numbness. Resolving upon ED evaluation   -CT head concerning for acute vs chronic infarct in the right frontal lobe  -MRI brain tiny acute infarcts in bilateral postcentral gyri and right posterior parietal lobe, suspected embolic distribution  -Neurology consulted  -Echocardiogram ordered  -LDL 96, goal <70 with CVA, continue atorvastatin 40 mg   -Ha1c 5.8  -continue aspirin 81 mg   -outpatient follow up with electrophysiology, Dr. Frances Pathak       Hyperlipidemia:  -high intensity statin      Hypertension:   -restart home medications on discharge     ADDITIONAL CARE RECOMMENDATIONS:   Take aspirin 81 mg daily and atorvastatin 40 mg daily. Please call your primary care physician to discuss hospitalization. You will need to follow up with Dr. Frances Pathak for either Holter monitor or Loop recorder. Return to the hospital for any new symptoms. PENDING TEST RESULTS:   At the time of discharge the following test results are still pending: official echocardiogram read    FOLLOW UP APPOINTMENTS:    Follow-up Information     Follow up With Specialties Details Why Terrace Hammersmith, MD [address]  [address] [facility]  [phone]               DISCHARGE MEDICATIONS:  Current Discharge Medication List      START taking these medications    Details   aspirin 81 mg chewable tablet Take 1 Tab by mouth daily. Qty: 30 Tab, Refills: 0         CONTINUE these medications which have CHANGED    Details   atorvastatin (LIPITOR) 40 mg tablet Take 1 Tab by mouth nightly.   Qty: 30 Tab, Refills: 0               NOTIFY YOUR PHYSICIAN FOR ANY OF THE FOLLOWING:   Fever over 101 degrees for 24 hours. Chest pain, shortness of breath, fever, chills, nausea, vomiting, diarrhea, change in mentation, falling, weakness, bleeding. Severe pain or pain not relieved by medications. Or, any other signs or symptoms that you may have questions about. DISPOSITION:  x  Home With:   OT  PT  HH  RN       Long term SNF/Inpatient Rehab    Independent/assisted living    Hospice    Other:       PATIENT CONDITION AT DISCHARGE:     Functional status    Poor     Deconditioned    x Independent      Cognition   x  Lucid     Forgetful     Dementia      Catheters/lines (plus indication)    Shaikh     PICC     PEG    x None      Code status    x Full code     DNR      PHYSICAL EXAMINATION AT DISCHARGE:  Constitutional:  No acute distress, cooperative, pleasant    ENT:  Oral mucosa moist, oropharynx benign. Resp:  CTA bilaterally. No wheezing/rhonchi/rales. No accessory muscle use   CV:  Regular rhythm, normal rate, no murmurs, gallops, rubs    GI:  Soft, non distended, non tender. normoactive bowel sounds, no hepatosplenomegaly     Musculoskeletal:  No edema, warm, 2+ pulses throughout    Neurologic:  Moves all extremities.   AAOx3, speech clear, strength intact             CHRONIC MEDICAL DIAGNOSES:  Problem List as of 12/16/2020 Never Reviewed          Codes Class Noted - Resolved    CVA (cerebral vascular accident) [facility]) ICD-10-CM: I63.9  ICD-9-CM: 434.91  12/16/2020 - Present        Left sided numbness ICD-10-CM: R20.0  ICD-9-CM: 782.0  12/15/2020 - Present              Greater than 30 minutes were spent with the patient on counseling and coordination of care    Signed:   [address], DO  12/16/2020  5:26 PM

## 2020-12-16 NOTE — CONSULTS
Neuro consult completed, dictated note to follow. Pt is a 72yo RH female with h/o left ON in 2009 attributed to \"autoimmune issue\" treated with IV solumedrol, HTN (on an antihypertensive med, cannot recall name) with /86 at presentation, and HLD on Lipitor 10mg daily with LDL 96, presenting 12/15/20 with left facial numbness, resolved after 3 hours in ED. HgbA1C is 5.8. Not on APT or [address] at home. Exam with BMI 29.5, upgoing toe on right, o/w unremarkable. MRI brain with bilateral tiny infarcts in postcentral gyri, and right posterior parietal region. Findings concerning for embolic event. Echo is pending. 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 May need DRE and/or more prolonged outpt monitoring pending Echo report. D/w Dr. Jia Portillo. Continue ASA 81mg daily for now. Continue high dose statin. No PT/OT/ST needs.

## 2020-12-16 NOTE — PROGRESS NOTES
Problem: TIA/CVA Stroke: 0-24 hours  Goal: Activity/Safety  Outcome: Progressing Towards Goal  Note: Up ad oleksandr  Goal: Diagnostic Test/Procedures  Outcome: Progressing Towards Goal  Note: For echo in am.  MRI and CT complete     Problem: TIA/CVA Stroke: 0-24 hours  Goal: Diagnostic Test/Procedures  Outcome: Progressing Towards Goal  Note: For echo in am.  MRI and CT complete

## 2020-12-16 NOTE — PROGRESS NOTES
Verbal shift change report given to Antonio Estevez RN by [facility] RN. Report included the following information SBAR, Kardex, Intake/Output, MAR and Cardiac Rhythm NSR.

## 2020-12-17 LAB
ECHO AO ROOT DIAM: 2.68 CM
ECHO AR MAX VEL PISA: 340.02 CM/S
ECHO AV AREA PEAK VELOCITY: 2.07 CM2
ECHO AV AREA/BSA PEAK VELOCITY: 1.1 CM2/M2
ECHO AV PEAK GRADIENT: 8.28 MMHG
ECHO AV PEAK VELOCITY: 143.9 CM/S
ECHO AV REGURGITANT PHT: 552.9 MS
ECHO LA AREA 4C: 13.4 CM2
ECHO LA MAJOR AXIS: 3.31 CM
ECHO LA MINOR AXIS: 1.76 CM
ECHO LA VOL 2C: 35.36 ML (ref 22–52)
ECHO LA VOL 4C: 27.5 ML (ref 22–52)
ECHO LA VOL BP: 34.49 ML (ref 22–52)
ECHO LA VOL/BSA BIPLANE: 18.36 ML/M2 (ref 16–28)
ECHO LA VOLUME INDEX A2C: 18.83 ML/M2 (ref 16–28)
ECHO LA VOLUME INDEX A4C: 14.64 ML/M2 (ref 16–28)
ECHO LV INTERNAL DIMENSION DIASTOLIC: 4.52 CM (ref 3.9–5.3)
ECHO LV INTERNAL DIMENSION SYSTOLIC: 2.72 CM
ECHO LV IVSD: 0.96 CM (ref 0.6–0.9)
ECHO LV MASS 2D: 160.8 G (ref 67–162)
ECHO LV MASS INDEX 2D: 85.6 G/M2 (ref 43–95)
ECHO LV POSTERIOR WALL DIASTOLIC: 1.1 CM (ref 0.6–0.9)
ECHO LVOT DIAM: 1.93 CM
ECHO LVOT PEAK GRADIENT: 4.17 MMHG
ECHO LVOT PEAK VELOCITY: 102.11 CM/S
ECHO MV A VELOCITY: 82.62 CM/S
ECHO MV AREA PHT: 4.01 CM2
ECHO MV E DECELERATION TIME (DT): 188.98 MS
ECHO MV E VELOCITY: 62.25 CM/S
ECHO MV E/A RATIO: 0.75
ECHO MV PRESSURE HALF TIME (PHT): 54.8 MS
ECHO PV PEAK INSTANTANEOUS GRADIENT SYSTOLIC: 7.93 MMHG
ECHO RV INTERNAL DIMENSION: 3.74 CM
ECHO RV TAPSE: 2.89 CM (ref 1.5–2)
ECHO TV REGURGITANT MAX VELOCITY: 274.87 CM/S
ECHO TV REGURGITANT PEAK GRADIENT: 30.22 MMHG

## 2020-12-17 NOTE — CONSULTS
[address]    Name:  Nicky Witt  MR#:  [id number]  :  1955  ACCOUNT #:  [de-identified]  DATE OF SERVICE:  2020    NEUROLOGY CONSULTATION    HISTORY OF PRESENT ILLNESS:  This is a 59-year-old right-handed female who was admitted yesterday for left facial numbness. The patient reports she was on a zoom meeting from home when she noticed tightness in her left shoulder and then left facial numbness. She continued about her day, went on to work to a meeting, it became very obvious that her face was numb. She discussed it with coworkers and they recommended she come to the Emergency Department. The patient reports resolution of her symptoms after being in the ED for about 3 hours. She denied any extremity involvement. No speech or swallowing changes. No vision changes. No prior history of stroke. She does have hyperlipidemia with an LDL of 96, on Lipitor 10 mg a day at home and hypertension, presenting with a blood pressure of 170/86, on a new antihypertensive medicine in the last year, she thinks it may be lisinopril. She was not on antiplatelet therapy at home. She also has a history of a left optic neuritis in  that was attributed to \"autoimmune issue\" and treated with IV Solu-Medrol. No diagnosis of demyelinating disease was made at the time. Her CT of the head at presentation was unremarkable. CTA of the head and neck unremarkable. CTP negative. MRI showed a tiny right posterior parietal and bilateral precentral gyri infarcts concerning for embolic event. Her echocardiogram with bubble study is pending. Hemoglobin A1c 5.8. She does not smoke. She has been started on aspirin 81 mg a day and Lipitor 40 mg a day since admission without recurrent symptoms. PAST MEDICAL HISTORY:  1. Left optic neuritis in , seen by Dr. Rainer Murillo. 2.  Hypertension. 3.  Hyperlipidemia. 4.  Heart murmur.     MEDICATIONS AT HOME:  Lipitor 10 mg a day and an antihypertensive medication possibly Lipitor. ALLERGIES:  ERYTHROMYCIN AND PENICILLIN. SOCIAL HISTORY:  She lives in [place] alone. She is a  at the [employer]. No tobacco or drug use. Occasional wine. FAMILY HISTORY:  No strokes in the family. REVIEW OF SYSTEMS:  As per past medical history, [facility], otherwise reviewed and negative. PHYSICAL EXAMINATION:  VITAL SIGNS:  Blood pressure 139/89, pulse 80, respiratory rate 16, satting 97% on room air, temperature is 97.8. BMI of 29.5. GENERAL:  She is well-nourished, well-developed, healthy-appearing female sitting in bed, in no distress. HEART:  Regular rate and rhythm. She has a III/VI systolic ejection murmur. Carotids 2+. No bruits. EXTREMITIES:  Warm. No edema. 2+ radial pulses. NEUROLOGIC:  Mental Status:  Alert, oriented x4. Speech and language intact. Attention, memory and fund of knowledge appropriate. Cranial Nerves:  No facial asymmetry. No ptosis. Extraocular eye movements are intact without diplopia or nystagmus. Strength, sensation and hearing intact. Tongue midline. Palate elevates symmetrically. Trapezius and sternocleidomastoid are 5/5. Motor exam shows 5/5 strength throughout. No pronator drift. No tremor. Sensory exam is intact to light touch and pinprick throughout. Reflexes symmetric. Toes upgoing on the right, down on the left. Coordination:  Intact finger-to-nose, heel-to-shin, rapid alternating movements. Gait not assessed this time. STUDIES AND REPORTS:  Reviewed above in the HPI.     ASSESSMENT AND PLAN:  This is a 68-year-old right-handed female with hypertension and hyperlipidemia with an LDL of 96 on Lipitor 10 mg a day at home and blood pressure of 170/86 at presentation, on possibly lisinopril at home, not on any antiplatelet agents, presenting with left facial numbness resolved after admission, found to have bilateral small infarcts in the posterior central gyri and right posterior parietal region concerning for embolic event with exam revealing a BMI of 29.5, upgoing toe on the right, otherwise unremarkable. Her echo with bubble study is pending. 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 She may need a DRE and/or prolonged outpatient monitoring for atrial fibrillation pending the echo report. This was discussed with Dr. Ryanne Osman. Cardiology will be consulted. Will continue her on aspirin 81 mg a day for now, continue high-dose statin. She does not have any PT, OT, or speech therapy needs.       Itzel Duff MD      [id number]  D:  12/17/2020 7:30  T:  12/17/2020 9:40  JOB #:  [id number]

## 2020-12-21 ENCOUNTER — TELEPHONE (OUTPATIENT)
Dept: CARDIOLOGY CLINIC | Age: 65
End: 2020-12-21

## 2020-12-21 NOTE — TELEPHONE ENCOUNTER
Returned patient call. Verified patient by two identifiers. Spoke with Carrol(Nurse) who coordinated a hospital procedure with patient moments before our conversation. Patient confirmed; verbalized understanding and had no additional questions.

## 2020-12-21 NOTE — TELEPHONE ENCOUNTER
Patient calling because she was discharged from the hospital and   Dr. Martita Saavedra told her to call to set an appt for a Loop Monitor     She can be reached at [phone]    [facility]

## 2020-12-21 NOTE — LETTER
12/21/2020 11:56 AM 
 
Ms. Hossein Blayne [employer],[address] [facility] [id number] Your Implantable Loop Recorder procedure has been scheduled for 1/22/21 at 12:00 pm, at [facility]. 
 
Please report to Admitting Department by 10:00 am, or 2 hours prior to your scheduled procedure. You should not stop your medication prior to your scheduled procedure. After your procedure, you will need to follow up with Dr. Stone Parks nurse for a wound and device check. Your follow-up appointment has been scheduled for 2/5/21 at 9:40 am.  
 
Please call our office with any questions [phone] Sincerely, 
 
 
Rupert Hutton MD

## 2020-12-21 NOTE — TELEPHONE ENCOUNTER
Verified patient with two types of identifiers. Discussed with patient the difference between the 30 day loop and the ILR. Notified paitent Dr. Patience Raza recommends moving forward with the ILR at this time. Patient states she spoke with her PCP who agrees with Dr. Patience Raza. Scheduled patient for ILR for 1/22/20 at 12:00 pm pending insurance approval. Will mail pre procedure instructions. Verified address. Patient verbalized understanding and will call with any other questions.

## 2021-01-20 ENCOUNTER — TELEPHONE (OUTPATIENT)
Dept: CARDIOLOGY CLINIC | Age: 66
End: 2021-01-20

## 2021-01-20 NOTE — TELEPHONE ENCOUNTER
Patient calling in regards to procedure on Friday. States she has not heard back about how to prepare or any information.      Phone: [phone]

## 2021-01-20 NOTE — TELEPHONE ENCOUNTER
Verified patient with two types of identifiers. Patient states she never received letter that I sent on 12/21/20. Apologized pre procedure instructions were never received. Reviewed pre procedure instructions. Patient had questions regarding insurance authorization. Notified patient that per CC \"RADHA. \" Patient wanted to make sure Mario was also notified. Notified patient will verify with our authorization team and return her call with update. Patient verbalized understanding and will call with any other questions.

## 2021-01-21 NOTE — TELEPHONE ENCOUNTER
Heddy Schilder, Surgical  Insurance Authorization Specialist, spoke with patient and answered al authoriation questions. Attempted to reach patient by telephone.  A message was left for return call with any further questions

## 2021-01-22 ENCOUNTER — HOSPITAL ENCOUNTER (OUTPATIENT)
Age: 66
Setting detail: OUTPATIENT SURGERY
Discharge: HOME | End: 2021-01-22
Attending: INTERNAL MEDICINE | Admitting: INTERNAL MEDICINE
Payer: MEDICARE

## 2021-01-22 VITALS
SYSTOLIC BLOOD PRESSURE: 113 MMHG | DIASTOLIC BLOOD PRESSURE: 71 MMHG | RESPIRATION RATE: 20 BRPM | HEART RATE: 74 BPM | TEMPERATURE: 98.3 F

## 2021-01-22 DIAGNOSIS — Z95.818 STATUS POST PLACEMENT OF IMPLANTABLE LOOP RECORDER: Primary | ICD-10-CM

## 2021-01-22 DIAGNOSIS — I63.9 IMPENDING CEREBROVASCULAR ACCIDENT (HCC): ICD-10-CM

## 2021-01-22 DIAGNOSIS — I63.10 CEREBROVASCULAR ACCIDENT (CVA) DUE TO EMBOLISM OF PRECEREBRAL ARTERY (HCC): ICD-10-CM

## 2021-01-22 PROCEDURE — 33285 INSJ SUBQ CAR RHYTHM MNTR: CPT | Performed by: INTERNAL MEDICINE

## 2021-01-22 PROCEDURE — 74011000250 HC RX REV CODE- 250: Performed by: INTERNAL MEDICINE

## 2021-01-22 PROCEDURE — 77030010507 HC ADH SKN DERMBND J&J -B: Performed by: INTERNAL MEDICINE

## 2021-01-22 PROCEDURE — 99220 PR INITIAL OBSERVATION CARE/DAY 70 MINUTES: CPT | Performed by: INTERNAL MEDICINE

## 2021-01-22 PROCEDURE — C1764 EVENT RECORDER, CARDIAC: HCPCS | Performed by: INTERNAL MEDICINE

## 2021-01-22 RX ORDER — LIDOCAINE HYDROCHLORIDE 10 MG/ML
1-20 INJECTION INFILTRATION; PERINEURAL ONCE
Status: COMPLETED | OUTPATIENT
Start: 2021-01-22 | End: 2021-01-22

## 2021-01-22 RX ORDER — CLINDAMYCIN HYDROCHLORIDE 150 MG/1
150 CAPSULE ORAL 3 TIMES DAILY
Qty: 9 CAP | Refills: 0 | Status: SHIPPED | OUTPATIENT
Start: 2021-01-22 | End: 2021-01-25

## 2021-01-22 RX ADMIN — LIDOCAINE HYDROCHLORIDE 20 ML: 10 INJECTION, SOLUTION INFILTRATION; PERINEURAL at 11:09

## 2021-01-22 NOTE — DISCHARGE INSTRUCTIONS
Discharge Instructions Post Implantable Loop Recorder      1.  You may remove your dressing tomorrow.  Do not peel or scrub the underlying skin glue; it will break down on its own over time.      2.  You may shower the day following your procedure.    3.  An antibiotic was sent electronically to your pharmacy on record.  Please pick it up & take as directed.    4.  You may use over the counter pain medication and/or ice for discomfort.    5.  Follow up with Dr. Liu's office as noted below.    Future Appointments   Date Time Provider Department Center   2/5/2021  9:40 AM Loop recorder EDER JOE   2/5/2021 10:00 AM WOUND CHECKS, EDER Liu M.D.  Electrophysiology/Cardiology  [facility] [place]  [address] [place] [address] [phone]

## 2021-01-22 NOTE — H&P
Cardiac Electrophysiology H&P Note     Subjective:      Micheal Gaytan is a 76 y.o. patient who presents for planned implantable loop recorder insertion. She was admitted in 12/2020 for cryptogenic CVA. Brain MRI at that time showed infarcts in the bilateral postcentral gyri in the right posterior parietal lobe, presumably embolic in origin. Also with chronic infarct in the right frontal lobe, right parietal lobe, & bilateral cerebellum (L>R). No known history AF/AFL. Echo in 12/2020 showed normal LV function & no shunt. Previous:  No family history of CVA. Problem List  Never Reviewed          Codes Class Noted    CVA (cerebral vascular accident) ([facility] [address] ICD-10-CM: I63.9  ICD-9-CM: 434.91  12/16/2020        Left sided numbness ICD-10-CM: R20.0  ICD-9-CM: 782.0  12/15/2020                Allergies   Allergen Reactions    Erythromycin Rash    Pcn [Penicillins] Rash     Past Medical History:   Diagnosis Date    Heart murmur     Hypercholesteremia     Hypertension      No past surgical history    No family history of stroke  Social History     Tobacco Use    Smoking status: Not on file   Substance Use Topics    Alcohol use: Not on file        Review of Systems: Review of all other systems otherwise negative. Constitutional: Negative for fever, chills, weight loss, malaise/fatigue. HEENT: Negative for nosebleeds, vision changes. Respiratory: Negative for cough, hemoptysis  Cardiovascular: Negative for chest pain, palpitations, orthopnea, claudication, leg swelling, syncope, and PND. Gastrointestinal: Negative for nausea, vomiting, diarrhea, blood in stool and melena. Genitourinary: Negative for dysuria, and hematuria. Musculoskeletal: Negative for myalgias, arthralgia. Skin: Negative for rash. Heme: Does not bleed or bruise easily.    Neurological: Negative for speech change and focal weakness     Objective:     Visit Vitals  /81 (BP 1 Location: Right arm, BP Patient Position: At rest;Supine)   Pulse 85   Temp 98.3 °F (36.8 °C)   Resp 19          Physical Exam:   Constitutional: Well-developed and well-nourished. No respiratory distress. Head: Normocephalic and atraumatic. Eyes: Pupils are equal, round  ENT: Hearing normal  Neck: Supple. No JVD present. Cardiovascular: Normal rate, regular rhythm. Exam reveals no gallop and no friction rub. No murmur heard. Pulmonary/Chest: Effort normal and breath sounds normal. No wheezes. Abdominal: Soft, no tenderness. Musculoskeletal: Moves extremities independently. Vasc/lymphatic: No edema. Neurological: Alert,oriented. Skin: Skin is warm and dry  Psychiatric: Normal mood and affect. Behavior is normal. Judgment and thought content normal.      EKG (12/15/2020): NSR 80 bpm.      Imaging/Studies:  Echo (12/16/2020): LVEF 72%, grade 1 diastolic dysfunction. Likely negative bubble study, no shunt. MRI brain (12/15/2020): Tiny acute infarcts in the bilat postcentral gyri & right posterior parietal lobe, presumably embolic given distribution. Small chronic infarcts in right frontal lobe, right parietal lobe, & bilateral cerebellum, L>R. Assessment/Plan:     Ms. Altaf Mcnamara has had chronic & acute CVA in multiple vascular territories consistent with embolism. History of HTN does not explain mechanism of CVA. Risks/benefits of ILR for long term surveillance for PAF/AFL reviewed. She agrees to proceed as scheduled.       MIRNA Rodriguez [facility]  01/22/21    Addendum from EP attending:   I have seen, examined patient, and discussed with nurse practitioner, registered nurse, reviewed, updated note and agree with the assessment and plan    I have talked to her this am. She is feeling fine, no concerns  Vital signs are stable  Exam shows regular rhythm       Assessment and Plan:  She agrees with implantable loop recorder for PAF as possible cause of embolic stroke    Thank you for involving me in this patient's care and please call with further concerns or questions. Karla Ozuna M.D.   Electrophysiology/Cardiology  [facility] [place]  [facility], [facility] [address] [facility], [place] [address] [place] [address]  [phone]

## 2021-01-22 NOTE — PROGRESS NOTES
1130 Postoperative discharge instructions reviewed with patient and all questions answered. Patient dressed self and was discharged ambulatory at 1140 to home.

## 2021-01-22 NOTE — PROCEDURES
Cardiac Procedure Note   Patient: Louise Horowitz  MRN: [id number]  SSN: [id number]   YOB: 1955 Age: 76 y.o.   Sex: female    Date of Procedure: 1/22/2021   Pre-procedure Diagnosis: cryptogenic embolic stroke  Post-procedure Diagnosis:same  Procedure: loop recorder implant  :  Dr. Darryl Cartagena MD    Assistant(s):  None  Anesthesia: local lidocaine  Estimated Blood Loss: Less than 10 mL   Specimens Removed: None  Findings: left chest loop recorder implant  Complications: None   Implants: [employer] loop recorder  Signed by:  Darryl Cartagena MD  1/22/2021  11:26 AM

## 2021-01-22 NOTE — PROGRESS NOTES
Cardiac Cath Lab Recovery Arrival Note:      Erik Loja arrived to Cardiac Cath Lab, Recovery Area. Staff introduced to patient. Patient identifiers verified with NAME and DATE OF BIRTH. Procedure verified with patient. Consent forms reviewed and signed by patient or authorized representative and verified. Allergies verified. Patient and family oriented to department. Patient and family informed of procedure and plan of care. Questions answered with review. Patient prepped for procedure, per orders from physician, prior to arrival.    Patient on cardiac monitor, non-invasive blood pressure, SPO2 monitor. On room air. Patient is A&Ox 4. Patient reports no pain. Patient in stretcher, in low position, with side rails up, call bell within reach, patient instructed to call if assistance as needed. Patient prep in: [address], [place] [address].    Patient family has pager # [phone]  Family in: NA.   Prep by: SKIP

## 2021-02-05 ENCOUNTER — CLINICAL SUPPORT (OUTPATIENT)
Dept: CARDIOLOGY CLINIC | Age: 66
End: 2021-02-05

## 2021-02-05 DIAGNOSIS — Z95.818 STATUS POST PLACEMENT OF IMPLANTABLE LOOP RECORDER: Primary | ICD-10-CM

## 2021-02-05 NOTE — PROGRESS NOTES
Cardiac Electrophysiology Wound Check Note      Wound Check   Patient is here for wound check s/p ILR. No fever, drainage   Physical Exam   Constitutional: well-developed and well-nourished. Skin: Mid Chest incision is healing without redness, drainage, hematoma. The wound is intact with skin glue. ASSESSMENT and PLAN   The incision is healing without redness, drainage, hematoma. Intact with skin glue.   The patient has finished their anti-biotic  Device check shows proper functions    Follow-up Disposition:  1 year or sooner if needed

## 2021-03-10 ENCOUNTER — HOSPITAL ENCOUNTER (OUTPATIENT)
Dept: LAB | Age: 66
Discharge: HOME | End: 2021-03-10

## 2021-03-10 ENCOUNTER — OFFICE VISIT (OUTPATIENT)
Dept: NEUROLOGY | Age: 66
End: 2021-03-10

## 2021-03-10 VITALS
BODY MASS INDEX: 30.12 KG/M2 | DIASTOLIC BLOOD PRESSURE: 70 MMHG | OXYGEN SATURATION: 99 % | HEART RATE: 101 BPM | WEIGHT: 180.8 LBS | SYSTOLIC BLOOD PRESSURE: 130 MMHG | HEIGHT: 65 IN

## 2021-03-10 DIAGNOSIS — R20.2 PARESTHESIA: ICD-10-CM

## 2021-03-10 DIAGNOSIS — I63.10 CEREBROVASCULAR ACCIDENT (CVA) DUE TO EMBOLISM OF PRECEREBRAL ARTERY (HCC): ICD-10-CM

## 2021-03-10 DIAGNOSIS — M79.622 LEFT UPPER ARM PAIN: Primary | ICD-10-CM

## 2021-03-10 LAB — ASPIRIN TEST, ASPIRN: 500 ARU

## 2021-03-10 PROCEDURE — 99215 OFFICE O/P EST HI 40 MIN: CPT | Performed by: PSYCHIATRY & NEUROLOGY

## 2021-03-10 PROCEDURE — G8432 DEP SCR NOT DOC, RNG: HCPCS | Performed by: PSYCHIATRY & NEUROLOGY

## 2021-03-10 PROCEDURE — G8427 DOCREV CUR MEDS BY ELIG CLIN: HCPCS | Performed by: PSYCHIATRY & NEUROLOGY

## 2021-03-10 PROCEDURE — 1090F PRES/ABSN URINE INCON ASSESS: CPT | Performed by: PSYCHIATRY & NEUROLOGY

## 2021-03-10 PROCEDURE — G8400 PT W/DXA NO RESULTS DOC: HCPCS | Performed by: PSYCHIATRY & NEUROLOGY

## 2021-03-10 PROCEDURE — G8536 NO DOC ELDER MAL SCRN: HCPCS | Performed by: PSYCHIATRY & NEUROLOGY

## 2021-03-10 PROCEDURE — G8417 CALC BMI ABV UP PARAM F/U: HCPCS | Performed by: PSYCHIATRY & NEUROLOGY

## 2021-03-10 PROCEDURE — 1101F PT FALLS ASSESS-DOCD LE1/YR: CPT | Performed by: PSYCHIATRY & NEUROLOGY

## 2021-03-10 PROCEDURE — 3017F COLORECTAL CA SCREEN DOC REV: CPT | Performed by: PSYCHIATRY & NEUROLOGY

## 2021-03-10 RX ORDER — LISINOPRIL 20 MG/1
TABLET ORAL
COMMUNITY
Start: 2021-01-14

## 2021-03-10 NOTE — LETTER
3/10/2021 Patient: Storm Hawley YOB: 1955 Date of Visit: 3/10/2021 Thony Knight MD 
[address] [place] [address] Via Fax: [phone] Dear Thony Knight MD, Thank you for referring Ms. Storm Hawley to [address] [place] [address] for evaluation. My notes for this consultation are attached. If you have questions, please do not hesitate to call me. I look forward to following your patient along with you. Sincerely, Kari Garay MD

## 2021-03-10 NOTE — PROGRESS NOTES
Neurology Consult Note      HISTORY PROVIDED BY: patient    Chief Complaint:   Chief Complaint   Patient presents with   [address] Follow Up     facial numbness. .. left ear numbness tingling in left shouder and intense itching      Subjective:    Magda Pichardo is a 76 y.o. right handed female initially and last seen while hospitalized 12/16/20 with hypertension and hyperlipidemia with an LDL of 96 on Lipitor 10 mg a day at home and blood pressure of 170/86 at presentation, on possibly lisinopril at home, not on any antiplatelet agents, presenting with left facial and arm numbness resolved after admission, found to have bilateral small infarcts in the posterior central gyri and right posterior parietal region concerning for embolic event with exam revealing a BMI of 29.5, upgoing toe on the right, otherwise unremarkable. CTA H/N were unremarkable. TTE with bubble study with EF 65% and no PFO seen. Suggested she may need DRE and/or prolonged outpatient monitoring for atrial fibrillation, seen by Cardiology who suggested 30 day event monitor vs ILR, pt declined both. Aspirin 81 mg a day for now, continue high-dose statin. She returns for f/u. She had ILR placed 1/22/21 by Dr. Sumeet Jordan. She thought that this was for 30-60 days and wants to know about when to have it removed. She had a f/u with Dr. Camron Rossi and told him about numbness in left ear that is intermittent. She has tingling and intense itching over left shoulder blade that started after discharge. She has pain in left tricep with certain motions such as when fastening bra. She has had chronic numbness in left leg for years related to spine disease. Past Medical History:   Diagnosis Date    Embolic stroke ([address]     Heart murmur     Hypercholesteremia     Hypertension     Optic neuritis, left 2009     seen by Dr. Tracie Saldana.       Past Surgical History:   Procedure Laterality Date    NH INSERTION SUBQ CARDIAC RHYTHM MONITOR W/[facility] N/A 1/22/2021    LOOP RECORDER INSERT performed by Alexandra Espinoza MD at [address], [facility]      Social History     Socioeconomic History    Marital status: SINGLE     Spouse name: Not on file    Number of children: Not on file    Years of education: Not on file    Highest education level: Not on file   Occupational History    Occupation: She is a  at the [employer] strain: Not on file    Food insecurity     Worry: Not on file     Inability: Not on file   [employer] needs     Medical: Not on file     Non-medical: Not on file   Tobacco Use    Smoking status: Never Smoker    Smokeless tobacco: Never Used   Substance and Sexual Activity    Alcohol use: Yes     Alcohol/week: 1.0 standard drinks     Types: 1 Glasses of wine per week     Frequency: Monthly or less     Comment: occasionally    Drug use: Never    Sexual activity: Not on file   Lifestyle    Physical activity     Days per week: Not on file     Minutes per session: Not on file    Stress: Not on file   Relationships    Social connections     Talks on phone: Not on file     Gets together: Not on file     Attends [employer] service: Not on file     Active member of club or organization: Not on file     Attends meetings of clubs or organizations: Not on file     Relationship status: Not on file    Intimate partner violence     Fear of current or ex partner: Not on file     Emotionally abused: Not on file     Physically abused: Not on file     Forced sexual activity: Not on file   Other Topics Concern    Not on file   Social History Narrative    L[facility] in [facility]     Family History   Problem Relation Age of Onset    No Known Problems Mother     Dementia Father     Heart Disease Father     Parkinson's Disease Father     Stroke Father        Objective:   Review of Systems   Constitutional: Positive for malaise/fatigue. HENT: Negative. Eyes: Negative. Respiratory: Negative. Snoring   Cardiovascular: Negative. Gastrointestinal: Positive for constipation. Genitourinary: Negative. Musculoskeletal: Positive for joint pain and myalgias. Skin: Negative. Neurological: Negative. Endo/Heme/Allergies: Negative. Psychiatric/Behavioral: Negative. Allergies   Allergen Reactions    Erythromycin Rash    Pcn [Penicillins] Rash        Meds:  Outpatient Medications Prior to Visit   Medication Sig Dispense Refill    lisinopriL (PRINIVIL, ZESTRIL) 20 mg tablet TAKE 1 TABLET BY MOUTH EVERY DAY      atorvastatin (LIPITOR) 40 mg tablet Take 1 Tab by mouth nightly. 30 Tab 0    aspirin 81 mg chewable tablet Take 1 Tab by mouth daily. 30 Tab 0     No facility-administered medications prior to visit. Imaging:  MRI Results (most recent):  Results from Hospital Encounter encounter on 12/15/20   MRI BRAIN W WO CONT    Narrative EXAM:  MRI BRAIN W WO CONT    INDICATION:    acute onset left sided numbness with CT head changes    COMPARISON:  CTA head and neck 12/15/2020, MRI brain 12/15/2009. CONTRAST: 15 ml Dotarem. TECHNIQUE:    Multiplanar multisequence acquisition without and with contrast of the brain. FINDINGS:  Tiny acute infarcts are noted within the right postcentral gyrus, left  postcentral gyrus, and right posterior parietal lobe. No evidence of acute  hemorrhage. The ventricles are normal in size and position. Small chronic infarcts in the  right frontal lobe and right parietal lobe, and bilateral cerebellum, left  greater than right. There is no extra-axial fluid collection or mass effect. There is no cerebellar tonsillar herniation. Expected arterial flow-voids are  present. No evidence of abnormal enhancement. The paranasal sinuses, mastoid air cells, and middle ears are clear. The orbital  contents are within normal limits. No significant osseous or scalp lesions are  identified. Impression IMPRESSION:     1.  Tiny acute infarcts in the bilateral postcentral gyri and right posterior  parietal lobe, presumably embolic given distribution. 2. Small chronic infarcts in the right frontal lobe, right parietal lobe, and  bilateral cerebellum, left greater than right. CT Results (most recent):  Results from Hospital Encounter encounter on 12/15/20   CT CODE NEURO PERF W CBF    Narrative INDICATION: numbness , code stroke    COMPARISON: Earlier CT    EXAM: CT brain perfusion was performed with generation of hemodynamic maps of  multiple parameters, including cerebral blood flow, cerebral blood volume, and  MTT (mean transit time). CT dose reduction was achieved through use of a  standardized protocol tailored for this examination and automatic exposure  control for dose modulation. FINDINGS: There is no demonstration of asymmetric blood flow or perfusion  defect. Impression IMPRESSION: No perfusion defect demonstrated. Reviewed records in [employer] and [facility] tab today    Lab Review   Results for orders placed or performed during the hospital encounter of 12/15/20   CBC WITH AUTOMATED DIFF   Result Value Ref Range    WBC 7.5 3.6 - 11.0 K/uL    RBC 4.39 3.80 - 5.20 M/uL    HGB 13.2 11.5 - 16.0 g/dL    HCT 40.3 35.0 - 47.0 %    MCV 91.8 80.0 - 99.0 FL    MCH 30.1 26.0 - 34.0 PG    MCHC 32.8 30.0 - 36.5 g/dL    RDW 12.8 11.5 - 14.5 %    PLATELET 906 352 - 251 K/uL    MPV 9.4 8.9 - 12.9 FL    NRBC 0.0 0  WBC    ABSOLUTE NRBC 0.00 0.00 - 0.01 K/uL    NEUTROPHILS 66 32 - 75 %    LYMPHOCYTES 26 12 - 49 %    MONOCYTES 7 5 - 13 %    EOSINOPHILS 1 0 - 7 %    BASOPHILS 0 0 - 1 %    IMMATURE GRANULOCYTES 0 0.0 - 0.5 %    ABS. NEUTROPHILS 4.9 1.8 - 8.0 K/UL    ABS. LYMPHOCYTES 1.9 0.8 - 3.5 K/UL    ABS. MONOCYTES 0.6 0.0 - 1.0 K/UL    ABS. EOSINOPHILS 0.1 0.0 - 0.4 K/UL    ABS. BASOPHILS 0.0 0.0 - 0.1 K/UL    ABS. IMM.  GRANS. 0.0 0.00 - 0.04 K/UL    DF AUTOMATED     PROTHROMBIN TIME + INR   Result Value Ref Range    INR 1.0 0.9 - 1.1 Prothrombin time 10.1 9.0 - 66.2 sec   METABOLIC PANEL, COMPREHENSIVE   Result Value Ref Range    Sodium 140 136 - 145 mmol/L    Potassium 4.0 3.5 - 5.1 mmol/L    Chloride 109 (H) 97 - 108 mmol/L    CO2 27 21 - 32 mmol/L    Anion gap 4 (L) 5 - 15 mmol/L    Glucose 133 (H) 65 - 100 mg/dL    BUN 19 6 - 20 MG/DL    Creatinine 1.13 (H) 0.55 - 1.02 MG/DL    BUN/Creatinine ratio 17 12 - 20      GFR est AA 58 (L) >60 ml/min/1.73m2    GFR est non-AA 48 (L) >60 ml/min/1.73m2    Calcium 9.0 8.5 - 10.1 MG/DL    Bilirubin, total 0.3 0.2 - 1.0 MG/DL    ALT (SGPT) 20 12 - 78 U/L    AST (SGOT) 15 15 - 37 U/L    Alk. phosphatase 60 45 - 117 U/L    Protein, total 7.2 6.4 - 8.2 g/dL    Albumin 3.8 3.5 - 5.0 g/dL    Globulin 3.4 2.0 - 4.0 g/dL    A-G Ratio 1.1 1.1 - 2.2     PTT   Result Value Ref Range    aPTT 24.8 22.1 - 31.0 sec    aPTT, therapeutic range     58.0 - 77.0 SECS   TROPONIN I   Result Value Ref Range    Troponin-I, Qt. <0.05 <0.05 ng/mL   SAMPLES BEING HELD   Result Value Ref Range    SAMPLES BEING HELD 1RED     COMMENT        Add-on orders for these samples will be processed based on acceptable specimen integrity and analyte stability, which may vary by analyte.    LIPID PANEL   Result Value Ref Range    LIPID PROFILE          Cholesterol, total 176 <200 MG/DL    Triglyceride 155 (H) <150 MG/DL    HDL Cholesterol 49 MG/DL    LDL, calculated 96 0 - 100 MG/DL    VLDL, calculated 31 MG/DL    CHOL/HDL Ratio 3.6 0.0 - 5.0     HEMOGLOBIN A1C WITH EAG   Result Value Ref Range    Hemoglobin A1c 5.8 (H) 4.0 - 5.6 %    Est. average glucose 120 mg/dL   GLUCOSE, POC   Result Value Ref Range    Glucose (POC) 131 (H) 65 - 100 mg/dL    Performed by Della Horowitz    EKG, 12 LEAD, INITIAL   Result Value Ref Range    Ventricular Rate 80 BPM    Atrial Rate 80 BPM    P-R Interval 156 ms    QRS Duration 76 ms    Q-T Interval 368 ms    QTC Calculation (Bezet) 424 ms    Calculated P Axis 40 degrees    Calculated R Axis 4 degrees    Calculated T Axis 37 degrees    Diagnosis       Normal sinus rhythm  When compared with ECG of 12-MAY-2012 14:10,  No significant change was found  Confirmed by Dayan Chu ([id number]) on 12/16/2020 11:02:31 AM     ECHO ADULT COMPLETE   Result Value Ref Range    IVSd 0.96 (A) 0.60 - 0.90 cm    LVIDd 4.52 3.90 - 5.30 cm    LVIDs 2.72 cm    LVOT d 1.93 cm    LVPWd 1.10 (A) 0.60 - 0.90 cm    LVOT Peak Gradient 4.17 mmHg    LVOT Peak Velocity 102.11 cm/s    RVIDd 3.74 cm    Left Atrium Major Axis 3.31 cm    LA Volume 34.49 22.0 - 52.0 mL    LA Area 4C 13.40 cm2    LA Vol 2C 35.36 22.00 - 52.00 mL    LA Vol 4C 27.50 22.00 - 52.00 mL    Aortic Valve Area by Continuity of Peak Velocity 2.07 cm2    Aortic Regurgitant Pressure Half-time 552.90 ms    AR Max Albert 340.02 cm/s    AoV PG 8.28 mmHg    Aortic Valve Systolic Peak Velocity 546.97 cm/s    MV A Albert 82.62 cm/s    Mitral Valve E Wave Deceleration Time 188.98 ms    MV E Albert 62.25 cm/s    Mitral Valve Pressure Half-time 54.80 ms    MVA (PHT) 4.01 cm2    Pulmonic Valve Systolic Peak Instantaneous Gradient 7.93 mmHg    Tapse 2.89 (A) 1.50 - 2.00 cm    Triscuspid Valve Regurgitation Peak Gradient 30.22 mmHg    TR Max Velocity 274.87 cm/s    Ao Root D 2.68 cm    MV E/A 0.75     LV Mass .8 67.0 - 162.0 g    LV Mass AL Index 85.6 43.0 - 95.0 g/m2    Left Atrium Minor Axis 1.76 cm    LA Vol Index 18.36 16.00 - 28.00 ml/m2    LA Vol Index 18.83 16.00 - 28.00 ml/m2    LA Vol Index 14.64 16.00 - 28.00 ml/m2    OLIVA/BSA Pk Albert 1.1 cm2/m2        Exam:  Visit Vitals  /70   Pulse (!) 101   Ht 5' 5\" (1.651 m)   Wt 180 lb 12.8 oz (82 kg)   SpO2 99%   BMI 30.09 kg/m²     General:  Alert, cooperative, no distress. Head:  Normocephalic, without obvious abnormality, atraumatic. Respiratory:  Heart:   Non labored breathing  Regular rate and rhythm, no murmurs   Neck:   2+ carotids, no bruits   Extremities: Warm, no cyanosis or edema. Tender to palpation over left tricep.    Pulses: 2+ radial pulses. Neurologic:  MS: Alert and oriented x 4, speech intact. Language intact. Attention and fund of knowledge appropriate. Recent and remote memory intact. Cranial Nerves:  II: visual fields    II: pupils    II: optic disc    III,VII: ptosis none   III,IV,VI: extraocular muscles  EOMI, no nystagmus or diplopia   V: facial light touch sensation  Dec on left ear only   VII: facial muscle function   symmetric   VIII: hearing intact   IX: soft palate elevation     XI: trapezius strength     XI: sternocleidomastoid strength    XII: tongue       Motor: normal bulk and tone, no tremor              Strength: 5/5 throughout, no PD  Sensory: intact to LT, PP  Coordination: FTN and HTS intact, ARTURO intact  Gait: normal gait  Reflexes: 2+ right UE, 3+ left UE, 2+ bilateral LE           Assessment/Plan   Pt is a 76 y.o. right handed female hospitalized 12/16/20 with hypertension with /86 at presentation on Lisinopril, and hyperlipidemia with an LDL of 96 on Lipitor 10 mg a day at home, not on any antiplatelet agents, presenting with left facial and arm numbness resolved after admission, found to have bilateral small infarcts in the posterior central gyri and right posterior parietal region concerning for embolic event, and chronic small infarcts in the right frontal lobe, right parietal lobe, and bilateral cerebellum. Without etiology found. New c/o intermittent left ear numbness, tingling and intense itching over left shoulder blade, and left tricep pain with certain arm movements. Exam revealing a BMI of 30, dec PP in left ear only, tender to palpation over left tricep. Discussed ILR and reason this was recommended. Will check an ASA test to make certain she is therapeutic on Aspirin 81 mg a day, continue high-dose statin. Discussed left ear numbness, possibly old stroke sxs resurfacing, unlikely related to a new stroke given how focal this is.   Suspect musculoskeletal issues of left shoulder causing tricep pain and paresthesias over left shoulder blade and unrelated to stroke without muscle weakness due to stroke. Referral to PT placed. F/u in clinic in a couple of months to reassess, instructed to call in the interim if needed.       ICD-10-CM ICD-9-CM    1. Left upper arm pain  M79.622 729.5 REFERRAL TO PHYSICAL THERAPY   2. Paresthesia  R20.2 782.0 REFERRAL TO PHYSICAL THERAPY   3. Cerebrovascular accident (CVA) due to embolism of precerebral artery (HCC)  I63.10 434.11 ASPIRIN TEST       Signed:  Sindi Coronado MD  3/10/2021

## 2021-04-16 ENCOUNTER — TELEPHONE (OUTPATIENT)
Dept: CARDIOLOGY CLINIC | Age: 66
End: 2021-04-16

## 2021-04-16 NOTE — TELEPHONE ENCOUNTER
Patient is requesting wither a vv or an in office visit with Dr. William Mcnamara. Please advise.     Phone: [phone]

## 2021-04-20 ENCOUNTER — TELEPHONE (OUTPATIENT)
Dept: CARDIOLOGY CLINIC | Age: 66
End: 2021-04-20

## 2021-04-22 NOTE — TELEPHONE ENCOUNTER
Called pt two patient identifiers confirmed. Pt stated she was concerned that she was not told everything about ILR. Went over why ILR was putt in, how long it normally last, and what it takes to remove. Notified pt that we are looking for A-fib because of the TIA that she had. Notified pt that she is monitored every day for alerts and if anything comes over we will call her. Notified if we find A-fib Dr. Maricruz Aceves will explant the device if it is her wish. Pt verbalized understanding of information discussed w/ no further questions at this time.

## 2021-04-23 ENCOUNTER — OFFICE VISIT (OUTPATIENT)
Dept: CARDIOLOGY CLINIC | Age: 66
End: 2021-04-23
Payer: MEDICARE

## 2021-04-23 DIAGNOSIS — Z95.818 STATUS POST PLACEMENT OF IMPLANTABLE LOOP RECORDER: Primary | ICD-10-CM

## 2021-04-23 PROCEDURE — 93298 REM INTERROG DEV EVAL SCRMS: CPT | Performed by: INTERNAL MEDICINE

## 2021-04-23 PROCEDURE — 93291 INTERROG DEV EVAL SCRMS IP: CPT

## 2021-07-23 ENCOUNTER — OFFICE VISIT (OUTPATIENT)
Dept: CARDIOLOGY CLINIC | Age: 66
End: 2021-07-23
Payer: MEDICARE

## 2021-07-23 DIAGNOSIS — Z95.818 STATUS POST PLACEMENT OF IMPLANTABLE LOOP RECORDER: Primary | ICD-10-CM

## 2021-07-23 PROCEDURE — 93298 REM INTERROG DEV EVAL SCRMS: CPT | Performed by: INTERNAL MEDICINE

## 2021-07-23 NOTE — PROGRESS NOTES
History of Present Illness





- Reason for Consult


Consult date: 11/08/19


acute renal failure


Requesting physician: CHRIS SARABIA





- History of Present Illness


 This is a 62 yo AAM with past medical history for metastatic NSC lung CA, who 

was referred to me yesterday for evaluation of abnormal kidney function tests. 

Records from 10/31/19 referring physician reviewed showed elevated 

BUN/Creatinine at  72/4.70mg/dl with estimated glomerular filtration rate of 

14mls/minute.  Pt was recently hospitalized at [facility] from 10/14 - 10/25 for MIGUELANGEL 

and worsening pulmonary infiltration from his lung cancer as he has been off 

chemotherapy for approximately 1 month now. UA in the hospital showed 1+ protein

otherwise unremarkable UA. Based on chart review renal function has been prog

ressively worsening since hospitalization in Sep 2019. Prior to that Cr was 

1.5mg/dl in 5/2019. Renal US from 10/17/19 showed: Trace amount of perinephric 

fluid bilaterally. Echogenic kidneys which can be seen in the setting of medical

renal disease. No evidence of hydronephrosis.


Patient  followed by MD Salvador and [facility]  s/p Carbo/Keytruda/Alimta induction, 

then switched to Keytruda/Alimta maintenance in June 2019. Pt has no History of 

Diabetes mellitus, no history of Hypertension.


No history of fever or rash. No recent sore throat or skin infection. No chronic

sinus symptoms. No history of recurrent kidneys stones. There is no history of 

recurrent urinary tract infections. No history of joint pains or low back pain. 

No previous history of blood transfusion. No history of hepatitis B or C. No 

history of ingestion of Non-steroidal anti-inflammatory drugs. Denies using 

herbal products or alternative medicine products.


Given progressive renal failure, pt was hospitalized for IV hydration, 

initiation of steroid therapy for presumed Acute interstitial nephritis and 

renal biopsy. 





Past History


Past Medical History: anemia, renal failure, other (Lung CA, metastatic )


Past Surgical History: No surgical history


Social history: denies: smoking, alcohol abuse, prescription drug abuse, IV drug

use


Family history: cancer





Medications and Allergies


                                    Allergies











Allergy/AdvReac Type Severity Reaction Status Date / Time


 


No Known Allergies Allergy   Verified 11/07/19 12:18











Active Meds: 


Active Medications





Acetaminophen (Tylenol)  650 mg PO Q4H PRN


   PRN Reason: Pain MILD(1-3)/Fever >100.5/HA


Albuterol/Ipratropium (Duoneb *Not For Prn Use*)  1 ampul IH TIDRT [facility]


Famotidine (Pepcid)  20 mg PO BID [facility]


   Last Admin: 11/08/19 11:50 Dose:  20 mg


   Documented by: 


Hydromorphone HCl (Dilaudid)  0.5 mg IV Q3H PRN


   PRN Reason: Pain , Severe (7-10)


   Last Admin: 11/08/19 08:24 Dose:  0.5 mg


   Documented by: 


Sodium Chloride (Nacl 0.9% 1000 Ml)  1,000 mls @ 100 mls/hr IV AS DIRECT [facility]


   Last Admin: 11/07/19 23:17 Dose:  100 mls/hr


   Documented by: 


Methylprednisolone Sodium Succinate (Solu-Medrol)  250 mg IV DAILY [facility]


   Stop: 11/10/19 10:01


   Last Admin: 11/08/19 11:50 Dose:  250 mg


   Documented by: 


Ondansetron HCl (Zofran)  4 mg IV Q8H PRN


   PRN Reason: Nausea And Vomiting


Oxycodone/Acetaminophen (Percocet 5/325)  1 tab PO Q6H PRN


   PRN Reason: Pain, Moderate (4-6)


   Last Admin: 11/07/19 23:16 Dose:  1 tab


   Documented by: 


Sodium Chloride (Sodium Chloride Flush Syringe 10 Ml)  10 ml IV BID [facility]


   Last Admin: 11/08/19 11:58 Dose:  10 ml


   Documented by: 


Sodium Chloride (Sodium Chloride Flush Syringe 10 Ml)  10 ml IV PRN PRN


   PRN Reason: LINE FLUSH











Review of Systems


All systems: negative


Constitutional: anorexia, fatigue, weakness, malaise





Exam





- Vital Signs


Vital signs: 


                                   Vital Signs











Temp Pulse Resp BP Pulse Ox


 


 97.9 F   94 H  18   124/84   96 


 


 11/07/19 16:13  11/07/19 16:13  11/07/19 16:13  11/07/19 16:13  11/07/19 16:13














- General Appearance


General appearance: appears stated age, cachectic, chronically ill


EENT: ATNC, PERRL, mucous membranes moist


Neck: Present: neck supple


Respiratory: Clear to Ascultation


Heart: regular, S1S2


Gastrointestinal: Present: normoactive bowel sounds


Integumentary: no rash, other (no edema )


Neurologic: no focal deficit, alert and oriented x3, strength 5/5, CN 3-12 

intact


Psychiatric: mood/affect appropriate, cooperative





Results





- Lab Results





                                 11/08/19 05:41





                                 11/08/19 05:41


                             Most recent lab results











Calcium  8.5 mg/dL (8.4-10.2)   11/08/19  05:41    














Assessment and Plan





- Patient Problems


(1) Acute interstitial nephritis


Current Visit: Yes   Status: Acute   


Plan to address problem: 


Acute renal failure probably secondary to AIN in the setting of treatment with 

immune check point inhibitor (Keytruda), superimposed prerenal azotemia 

possible. Pt is off immune chemotherapy for > 1 month. Received only 1 week 

steroid therapy about 1 month ago, pt did not have any renal biopsy during 

previous hospitalizations. Pt now with generalized fatigue,weakness, nausea, 

possible uremic symptoms. started IV NS at 100ml/hr, IV solumedrol 250ml daily x

3 doses then to swtich to po prednisone. pt s/p renal biopsy this AM. Avoid 

nephrotoxins, NSAIDs, IV contrast, will monitor lytes/renal parameters and make 

further recommendations. 








(2) Acute renal failure


Current Visit: Yes   Status: Acute   


Plan to address problem: 


as abvoe 








(3) Metastatic lung cancer (metastasis from lung to other site)


Current Visit: Yes   Status: Acute   


Plan to address problem: 


Patient  followed by MD Salvador and [facility]  s/p Carbo/Keytruda/Alimta induction, 

then switched to Keytruda/Alimta maintenance in June 2019, which was stopped in 

sep 2019 after pt developed MIGUELANGEL. follow Hem/Onc recommendations








(4) Anemia in chronic illness


Current Visit: Yes   Status: Acute   


Plan to address problem: 


check serial Hb and transfuse with 1PRBC if Hb < 7 Remote LinQ  See attached  Chargeable visit.

## 2021-10-26 PROCEDURE — 93298 REM INTERROG DEV EVAL SCRMS: CPT | Performed by: INTERNAL MEDICINE

## 2021-10-29 ENCOUNTER — OFFICE VISIT (OUTPATIENT)
Dept: CARDIOLOGY CLINIC | Age: 66
End: 2021-10-29
Payer: MEDICARE

## 2021-10-29 DIAGNOSIS — Z95.818 STATUS POST PLACEMENT OF IMPLANTABLE LOOP RECORDER: Primary | ICD-10-CM

## 2022-01-25 PROCEDURE — 93298 REM INTERROG DEV EVAL SCRMS: CPT | Performed by: INTERNAL MEDICINE

## 2022-01-28 ENCOUNTER — OFFICE VISIT (OUTPATIENT)
Dept: CARDIOLOGY CLINIC | Age: 67
End: 2022-01-28
Payer: MEDICARE

## 2022-01-28 DIAGNOSIS — Z95.818 STATUS POST PLACEMENT OF IMPLANTABLE LOOP RECORDER: Primary | ICD-10-CM

## 2022-02-17 ENCOUNTER — OFFICE VISIT (OUTPATIENT)
Dept: CARDIOLOGY CLINIC | Age: 67
End: 2022-02-17
Payer: MEDICARE

## 2022-02-17 VITALS
BODY MASS INDEX: 30.49 KG/M2 | HEART RATE: 71 BPM | HEIGHT: 65 IN | SYSTOLIC BLOOD PRESSURE: 124 MMHG | DIASTOLIC BLOOD PRESSURE: 72 MMHG | OXYGEN SATURATION: 99 % | WEIGHT: 183 LBS

## 2022-02-17 DIAGNOSIS — Z98.890 HISTORY OF LOOP RECORDER: ICD-10-CM

## 2022-02-17 DIAGNOSIS — I63.9 CRYPTOGENIC STROKE (HCC): Primary | ICD-10-CM

## 2022-02-17 DIAGNOSIS — I10 PRIMARY HYPERTENSION: ICD-10-CM

## 2022-02-17 PROCEDURE — 99214 OFFICE O/P EST MOD 30 MIN: CPT | Performed by: INTERNAL MEDICINE

## 2022-02-17 PROCEDURE — G8432 DEP SCR NOT DOC, RNG: HCPCS | Performed by: INTERNAL MEDICINE

## 2022-02-17 PROCEDURE — G8417 CALC BMI ABV UP PARAM F/U: HCPCS | Performed by: INTERNAL MEDICINE

## 2022-02-17 PROCEDURE — G0463 HOSPITAL OUTPT CLINIC VISIT: HCPCS | Performed by: INTERNAL MEDICINE

## 2022-02-17 PROCEDURE — G8536 NO DOC ELDER MAL SCRN: HCPCS | Performed by: INTERNAL MEDICINE

## 2022-02-17 PROCEDURE — 1090F PRES/ABSN URINE INCON ASSESS: CPT | Performed by: INTERNAL MEDICINE

## 2022-02-17 PROCEDURE — 1101F PT FALLS ASSESS-DOCD LE1/YR: CPT | Performed by: INTERNAL MEDICINE

## 2022-02-17 PROCEDURE — 3017F COLORECTAL CA SCREEN DOC REV: CPT | Performed by: INTERNAL MEDICINE

## 2022-02-17 PROCEDURE — G8754 DIAS BP LESS 90: HCPCS | Performed by: INTERNAL MEDICINE

## 2022-02-17 PROCEDURE — G8752 SYS BP LESS 140: HCPCS | Performed by: INTERNAL MEDICINE

## 2022-02-17 PROCEDURE — G8427 DOCREV CUR MEDS BY ELIG CLIN: HCPCS | Performed by: INTERNAL MEDICINE

## 2022-02-17 PROCEDURE — G8400 PT W/DXA NO RESULTS DOC: HCPCS | Performed by: INTERNAL MEDICINE

## 2022-02-17 NOTE — PROGRESS NOTES
Cardiac Electrophysiology OFFICE Note     Subjective:       Emily Georges is a 78 y. o.patient who presents for follow up, is s/p Medtronic implantable loop recorder insertion (DOI 01/22/2021) for cryptogenic CVA. No PAF or AFL has been noted since implant. Echo in 12/2020 showed normal LV function & no shunt. She denies chest pain, palpitations, SOB, lightheadedness, or edema. BP controlled. Previous:   Admitted in 12/2020 for cryptogenic CVA.  Brain MRI at that time showed infarcts in the bilateral postcentral gyri in the right posterior parietal lobe, presumably embolic in origin.  Also with chronic infarct in the right frontal lobe, right parietal lobe, & bilateral cerebellum (L>R). No family history of CVA. Problem List     Status post placement of implantable loop recorder ICD-10-CM: Z95.818   ICD-9-CM: V45.09 1/22/2021   Overview Signed 1/22/2021 11:26 AM by Braulio Myers MD     1/22/21 Medtronic loop recorder         CVA (cerebral vascular accident) [facility]) ICD-10-CM: I63.9   ICD-9-CM: 434.91 12/16/2020     Left sided numbness ICD-10-CM: R20.0   ICD-9-CM: 782.0 12/15/2020       Allergies   Allergen Reactions   · Erythromycin Rash   · Pcn [Penicillins] Rash     Past Medical History:   Diagnosis Date   · Embolic stroke ([address]   · Heart murmur   · Hypercholesteremia   · Hypertension   · Optic neuritis, left 2009   seen by Dr. Peter Malik. No past surgical history     No family history of stroke   Social History     Tobacco Use   · Smoking status: Never Smoker   · Smokeless tobacco: Never Used   Substance Use Topics   · Alcohol use: Yes   Alcohol/week: 1.0 standard drink   Types: 1 Glasses of wine per week   Comment: occasionally         Review of Systems: Review of all other systems otherwise negative. Constitutional: Negative for fever, chills, weight loss, malaise/fatigue. HEENT: Negative for nosebleeds, vision changes. Respiratory: Negative for cough, hemoptysis. Cardiovascular: Negative for chest pain, palpitations, orthopnea, claudication, leg swelling, syncope, and PND. Gastrointestinal: Negative for nausea, vomiting, diarrhea, blood in stool and melena. Genitourinary: Negative for dysuria, and hematuria. Musculoskeletal: Negative for myalgias, arthralgia. Skin: Negative for rash. Heme: Does not bleed or bruise easily. Neurological: Negative for speech change and focal weakness.       Objective:   Visit Vitals  /72   Pulse 71   Ht 5' 5\" (1.651 m)   Wt 183 lb (83 kg)   SpO2 99%   BMI 30.45 kg/m²       Physical Exam:   Constitutional: Well-developed and well-nourished. No respiratory distress. Head: Normocephalic and atraumatic. Eyes: Pupils are equal, round. ENT: Hearing grossly normal.   Neck: Supple. No JVD present. Cardiovascular: Normal rate, regular rhythm. Exam reveals no gallop and no friction rub. No murmur heard. Pulmonary/Chest: Effort normal and breath sounds normal. No wheezes. Abdominal: Soft, no tenderness. Musculoskeletal: Moves extremities independently.     Vasc/lymphatic: No edema. Neurological: Alert, oriented. Skin: Skin is warm and dry. Psychiatric: Normal mood and affect. Behavior is normal. Judgment and thought content normal.       EKG (12/15/2020): NSR 80 bpm.       Assessment/Plan:     Imaging/Studies:   Echo (12/16/2020): LVEF 25%, grade 1 diastolic dysfunction.  Likely negative bubble study, no shunt.       MRI brain (12/15/2020): Tiny acute infarcts in the bilat postcentral gyri & right posterior parietal lobe, presumably embolic given distribution.  Small chronic infarcts in right frontal lobe, right parietal lobe, & bilateral cerebellum, L>R. ICD-10-CM ICD-9-CM    1. Cryptogenic stroke (HCC)  I63.9 434.91    2. History of loop recorder  Z98.890 V15.29    3. Primary hypertension  I10 401.9          Medtronic ILR (DOI 01/22/2021):  Implanted for cryptogenic CVA.  Anticipate 1-2 years generator longevity.  No PAF/AFL noted since implant.    Should AF/AFL be noted, would then recommend [facility] for embolic CVA prophylaxis.  For now, she will continue ASA 81 mg po daily. HTN: Doesn't explain the embolic pattern on head CT and MRI of CVA.  BP controlled.  Continue current medication regimen. Continue remote monitoring of ILR and I call her with abnormality    Thank you for involving me in this patient's care and please call with further concerns or questions. Yany Temple M.D.    Electrophysiology/Cardiology   [facility] [place]   [address]                     [address] [facility], [address] [place] [address] [facility], [place]   [phone]

## 2022-03-19 PROBLEM — I63.9 CVA (CEREBRAL VASCULAR ACCIDENT) (HCC): Status: ACTIVE | Noted: 2020-12-16

## 2022-03-19 PROBLEM — Z95.818 STATUS POST PLACEMENT OF IMPLANTABLE LOOP RECORDER: Status: ACTIVE | Noted: 2021-01-22

## 2022-03-20 PROBLEM — R20.0 LEFT SIDED NUMBNESS: Status: ACTIVE | Noted: 2020-12-15

## 2022-04-29 ENCOUNTER — OFFICE VISIT (OUTPATIENT)
Dept: CARDIOLOGY CLINIC | Age: 67
End: 2022-04-29
Payer: MEDICARE

## 2022-04-29 DIAGNOSIS — Z95.818 STATUS POST PLACEMENT OF IMPLANTABLE LOOP RECORDER: Primary | ICD-10-CM

## 2022-04-29 PROCEDURE — 93298 REM INTERROG DEV EVAL SCRMS: CPT | Performed by: INTERNAL MEDICINE

## 2022-07-25 ENCOUNTER — OFFICE VISIT (OUTPATIENT)
Dept: CARDIOLOGY CLINIC | Age: 67
End: 2022-07-25
Payer: MEDICARE

## 2022-07-25 DIAGNOSIS — Z95.818 STATUS POST PLACEMENT OF IMPLANTABLE LOOP RECORDER: Primary | ICD-10-CM

## 2022-07-25 PROCEDURE — 93298 REM INTERROG DEV EVAL SCRMS: CPT | Performed by: INTERNAL MEDICINE

## 2022-07-25 NOTE — LETTER
Ms. Stalin Anuj  [address] [place] [id number]    Dear Ms. Stalin Leslie,    We have received the remote transmission for your implanted loop recorder dated 7-25-22 . You have had no unusual heart rate episodes recorded. Your battery status is \"OK\". Your next scheduled remote transmission is 10-28-22. We will continue to monitor your device remotely as long as your home monitor remains plugged into power. Thank you for remaining connected remotely!     Sincerely,     [facility] OFFICE Remotes

## 2022-08-10 NOTE — PROGRESS NOTES
Chargeable implanted loop recorder scheduled remote. No events recorded as of 4/11/22. SR 80-90's. See scanned report in  for details.

## 2022-10-14 ENCOUNTER — OFFICE VISIT (OUTPATIENT)
Dept: CARDIOLOGY CLINIC | Age: 67
End: 2022-10-14
Payer: MEDICARE

## 2022-10-14 DIAGNOSIS — Z95.818 STATUS POST PLACEMENT OF IMPLANTABLE LOOP RECORDER: Primary | ICD-10-CM

## 2022-10-14 PROCEDURE — 93298 REM INTERROG DEV EVAL SCRMS: CPT | Performed by: INTERNAL MEDICINE

## 2022-10-14 NOTE — PROGRESS NOTES
C/ implanted loop recorder remote. Device functioning appropriately as programmed. See scanned documents in media manger.

## 2022-11-18 ENCOUNTER — OFFICE VISIT (OUTPATIENT)
Dept: CARDIOLOGY CLINIC | Age: 67
End: 2022-11-18
Payer: MEDICARE

## 2022-11-18 DIAGNOSIS — Z95.818 PRESENCE OF CARDIAC DEVICE: Primary | ICD-10-CM

## 2022-11-18 PROCEDURE — 93298 REM INTERROG DEV EVAL SCRMS: CPT | Performed by: INTERNAL MEDICINE

## 2022-11-18 NOTE — PROGRESS NOTES
C/ MDT ILR REMOTE  Scheduled ILR remote transmission.  No events  Chargeable visit  See scanned documents

## 2023-02-24 ENCOUNTER — OFFICE VISIT (OUTPATIENT)
Dept: CARDIOLOGY CLINIC | Age: 68
End: 2023-02-24
Payer: MEDICARE

## 2023-02-24 DIAGNOSIS — Z95.818 STATUS POST PLACEMENT OF IMPLANTABLE LOOP RECORDER: Primary | ICD-10-CM

## 2023-03-31 ENCOUNTER — OFFICE VISIT (OUTPATIENT)
Dept: CARDIOLOGY CLINIC | Age: 68
End: 2023-03-31

## 2023-03-31 DIAGNOSIS — Z95.818 STATUS POST PLACEMENT OF IMPLANTABLE LOOP RECORDER: Primary | ICD-10-CM

## 2023-06-30 ENCOUNTER — NURSE ONLY (OUTPATIENT)
Age: 68
End: 2023-06-30

## 2023-06-30 DIAGNOSIS — Z95.818 PRESENCE OF OTHER CARDIAC IMPLANTS AND GRAFTS: Primary | ICD-10-CM

## 2023-10-21 ENCOUNTER — APPOINTMENT (OUTPATIENT)
Facility: HOSPITAL | Age: 68
DRG: 063 | End: 2023-10-21
Payer: MEDICARE

## 2023-10-21 ENCOUNTER — HOSPITAL ENCOUNTER (INPATIENT)
Facility: HOSPITAL | Age: 68
LOS: 6 days | Discharge: NURSING, REHAB OR LONG-TERM CARE | DRG: 063 | End: 2023-10-27
Attending: EMERGENCY MEDICINE | Admitting: INTERNAL MEDICINE
Payer: MEDICARE

## 2023-10-21 DIAGNOSIS — I63.419 CEREBROVASCULAR ACCIDENT (CVA) DUE TO EMBOLISM OF MIDDLE CEREBRAL ARTERY, UNSPECIFIED BLOOD VESSEL LATERALITY (HCC): ICD-10-CM

## 2023-10-21 DIAGNOSIS — R20.0 LEFT SIDED NUMBNESS: ICD-10-CM

## 2023-10-21 DIAGNOSIS — I63.9 STROKE ABORTED BY ADMINISTRATION OF THROMBOLYTIC AGENT (HCC): Primary | ICD-10-CM

## 2023-10-21 DIAGNOSIS — R47.01 APHASIA: ICD-10-CM

## 2023-10-21 DIAGNOSIS — R53.1 RIGHT SIDED WEAKNESS: ICD-10-CM

## 2023-10-21 DIAGNOSIS — I63.411 CEREBROVASCULAR ACCIDENT (CVA) DUE TO EMBOLISM OF RIGHT MIDDLE CEREBRAL ARTERY (HCC): ICD-10-CM

## 2023-10-21 LAB
ALBUMIN SERPL-MCNC: 3.8 G/DL (ref 3.5–5)
ALBUMIN/GLOB SERPL: 1.1 (ref 1.1–2.2)
ALP SERPL-CCNC: 70 U/L (ref 45–117)
ALT SERPL-CCNC: 27 U/L (ref 12–78)
ANION GAP SERPL CALC-SCNC: 6 MMOL/L (ref 5–15)
AST SERPL-CCNC: 17 U/L (ref 15–37)
BASOPHILS # BLD: 0 K/UL (ref 0–0.1)
BASOPHILS NFR BLD: 1 % (ref 0–1)
BILIRUB SERPL-MCNC: 0.3 MG/DL (ref 0.2–1)
BUN SERPL-MCNC: 20 MG/DL (ref 6–20)
BUN/CREAT SERPL: 15 (ref 12–20)
CALCIUM SERPL-MCNC: 8.9 MG/DL (ref 8.5–10.1)
CHLORIDE SERPL-SCNC: 107 MMOL/L (ref 97–108)
CO2 SERPL-SCNC: 27 MMOL/L (ref 21–32)
CREAT SERPL-MCNC: 1.36 MG/DL (ref 0.55–1.02)
DIFFERENTIAL METHOD BLD: NORMAL
EKG ATRIAL RATE: 92 BPM
EKG DIAGNOSIS: NORMAL
EKG P AXIS: 45 DEGREES
EKG P-R INTERVAL: 164 MS
EKG Q-T INTERVAL: 362 MS
EKG QRS DURATION: 62 MS
EKG QTC CALCULATION (BAZETT): 447 MS
EKG R AXIS: 9 DEGREES
EKG T AXIS: 45 DEGREES
EKG VENTRICULAR RATE: 92 BPM
EOSINOPHIL # BLD: 0.1 K/UL (ref 0–0.4)
EOSINOPHIL NFR BLD: 1 % (ref 0–7)
ERYTHROCYTE [DISTWIDTH] IN BLOOD BY AUTOMATED COUNT: 12.8 % (ref 11.5–14.5)
GLOBULIN SER CALC-MCNC: 3.6 G/DL (ref 2–4)
GLUCOSE BLD STRIP.AUTO-MCNC: 148 MG/DL (ref 65–117)
GLUCOSE SERPL-MCNC: 205 MG/DL (ref 65–100)
HCT VFR BLD AUTO: 40.5 % (ref 35–47)
HGB BLD-MCNC: 13.3 G/DL (ref 11.5–16)
IMM GRANULOCYTES # BLD AUTO: 0 K/UL (ref 0–0.04)
IMM GRANULOCYTES NFR BLD AUTO: 0 % (ref 0–0.5)
INR PPP: 1 (ref 0.9–1.1)
LYMPHOCYTES # BLD: 2 K/UL (ref 0.8–3.5)
LYMPHOCYTES NFR BLD: 23 % (ref 12–49)
MCH RBC QN AUTO: 30.4 PG (ref 26–34)
MCHC RBC AUTO-ENTMCNC: 32.8 G/DL (ref 30–36.5)
MCV RBC AUTO: 92.7 FL (ref 80–99)
MONOCYTES # BLD: 0.6 K/UL (ref 0–1)
MONOCYTES NFR BLD: 6 % (ref 5–13)
NEUTS SEG # BLD: 6.2 K/UL (ref 1.8–8)
NEUTS SEG NFR BLD: 69 % (ref 32–75)
NRBC # BLD: 0 K/UL (ref 0–0.01)
NRBC BLD-RTO: 0 PER 100 WBC
PLATELET # BLD AUTO: 354 K/UL (ref 150–400)
PMV BLD AUTO: 9.1 FL (ref 8.9–12.9)
POTASSIUM SERPL-SCNC: 4 MMOL/L (ref 3.5–5.1)
PROT SERPL-MCNC: 7.4 G/DL (ref 6.4–8.2)
PROTHROMBIN TIME: 10.2 SEC (ref 9–11.1)
RBC # BLD AUTO: 4.37 M/UL (ref 3.8–5.2)
SERVICE CMNT-IMP: ABNORMAL
SODIUM SERPL-SCNC: 140 MMOL/L (ref 136–145)
TROPONIN I SERPL HS-MCNC: 17 NG/L (ref 0–51)
WBC # BLD AUTO: 8.8 K/UL (ref 3.6–11)

## 2023-10-21 PROCEDURE — 6360000002 HC RX W HCPCS

## 2023-10-21 PROCEDURE — 2580000003 HC RX 258: Performed by: INTERNAL MEDICINE

## 2023-10-21 PROCEDURE — 4A03X5D MEASUREMENT OF ARTERIAL FLOW, INTRACRANIAL, EXTERNAL APPROACH: ICD-10-PCS | Performed by: STUDENT IN AN ORGANIZED HEALTH CARE EDUCATION/TRAINING PROGRAM

## 2023-10-21 PROCEDURE — 82962 GLUCOSE BLOOD TEST: CPT

## 2023-10-21 PROCEDURE — 99285 EMERGENCY DEPT VISIT HI MDM: CPT

## 2023-10-21 PROCEDURE — 71045 X-RAY EXAM CHEST 1 VIEW: CPT

## 2023-10-21 PROCEDURE — 6360000004 HC RX CONTRAST MEDICATION: Performed by: EMERGENCY MEDICINE

## 2023-10-21 PROCEDURE — 36415 COLL VENOUS BLD VENIPUNCTURE: CPT

## 2023-10-21 PROCEDURE — 70450 CT HEAD/BRAIN W/O DYE: CPT

## 2023-10-21 PROCEDURE — 2000000000 HC ICU R&B

## 2023-10-21 PROCEDURE — 0042T CT BRAIN PERFUSION: CPT

## 2023-10-21 PROCEDURE — 80053 COMPREHEN METABOLIC PANEL: CPT

## 2023-10-21 PROCEDURE — 84484 ASSAY OF TROPONIN QUANT: CPT

## 2023-10-21 PROCEDURE — 70498 CT ANGIOGRAPHY NECK: CPT

## 2023-10-21 PROCEDURE — 2580000003 HC RX 258: Performed by: EMERGENCY MEDICINE

## 2023-10-21 PROCEDURE — 6370000000 HC RX 637 (ALT 250 FOR IP): Performed by: NURSE PRACTITIONER

## 2023-10-21 PROCEDURE — 85025 COMPLETE CBC W/AUTO DIFF WBC: CPT

## 2023-10-21 PROCEDURE — 70551 MRI BRAIN STEM W/O DYE: CPT

## 2023-10-21 PROCEDURE — 92977 HC THROMBOLYSIS/CORONARY: CPT

## 2023-10-21 PROCEDURE — 3E03317 INTRODUCTION OF OTHER THROMBOLYTIC INTO PERIPHERAL VEIN, PERCUTANEOUS APPROACH: ICD-10-PCS | Performed by: EMERGENCY MEDICINE

## 2023-10-21 PROCEDURE — 85610 PROTHROMBIN TIME: CPT

## 2023-10-21 RX ORDER — SODIUM CHLORIDE 0.9 % (FLUSH) 0.9 %
10 SYRINGE (ML) INJECTION ONCE
Status: COMPLETED | OUTPATIENT
Start: 2023-10-21 | End: 2023-10-21

## 2023-10-21 RX ORDER — SODIUM CHLORIDE 0.9 % (FLUSH) 0.9 %
5-40 SYRINGE (ML) INJECTION PRN
Status: DISCONTINUED | OUTPATIENT
Start: 2023-10-21 | End: 2023-10-22 | Stop reason: SDUPTHER

## 2023-10-21 RX ORDER — ENOXAPARIN SODIUM 100 MG/ML
40 INJECTION SUBCUTANEOUS DAILY
Status: DISCONTINUED | OUTPATIENT
Start: 2023-10-22 | End: 2023-10-27 | Stop reason: HOSPADM

## 2023-10-21 RX ORDER — SODIUM CHLORIDE 9 MG/ML
INJECTION, SOLUTION INTRAVENOUS PRN
Status: DISCONTINUED | OUTPATIENT
Start: 2023-10-21 | End: 2023-10-27 | Stop reason: HOSPADM

## 2023-10-21 RX ORDER — SODIUM CHLORIDE 9 MG/ML
INJECTION, SOLUTION INTRAVENOUS PRN
Status: DISCONTINUED | OUTPATIENT
Start: 2023-10-21 | End: 2023-10-22 | Stop reason: SDUPTHER

## 2023-10-21 RX ORDER — ONDANSETRON 4 MG/1
4 TABLET, ORALLY DISINTEGRATING ORAL EVERY 8 HOURS PRN
Status: DISCONTINUED | OUTPATIENT
Start: 2023-10-21 | End: 2023-10-27 | Stop reason: HOSPADM

## 2023-10-21 RX ORDER — ONDANSETRON 2 MG/ML
4 INJECTION INTRAMUSCULAR; INTRAVENOUS EVERY 6 HOURS PRN
Status: DISCONTINUED | OUTPATIENT
Start: 2023-10-21 | End: 2023-10-27 | Stop reason: HOSPADM

## 2023-10-21 RX ORDER — CASTOR OIL AND BALSAM, PERU 788; 87 MG/G; MG/G
OINTMENT TOPICAL 2 TIMES DAILY
Status: DISCONTINUED | OUTPATIENT
Start: 2023-10-21 | End: 2023-10-27 | Stop reason: HOSPADM

## 2023-10-21 RX ORDER — SODIUM CHLORIDE 0.9 % (FLUSH) 0.9 %
5-40 SYRINGE (ML) INJECTION PRN
Status: DISCONTINUED | OUTPATIENT
Start: 2023-10-21 | End: 2023-10-27 | Stop reason: HOSPADM

## 2023-10-21 RX ORDER — ATORVASTATIN CALCIUM 40 MG/1
80 TABLET, FILM COATED ORAL NIGHTLY
Status: DISCONTINUED | OUTPATIENT
Start: 2023-10-22 | End: 2023-10-27 | Stop reason: HOSPADM

## 2023-10-21 RX ORDER — SODIUM CHLORIDE 9 MG/ML
INJECTION, SOLUTION INTRAVENOUS CONTINUOUS
Status: DISCONTINUED | OUTPATIENT
Start: 2023-10-21 | End: 2023-10-22

## 2023-10-21 RX ORDER — ASPIRIN 81 MG/1
81 TABLET, CHEWABLE ORAL DAILY
Status: DISCONTINUED | OUTPATIENT
Start: 2023-10-22 | End: 2023-10-22

## 2023-10-21 RX ORDER — SODIUM CHLORIDE 0.9 % (FLUSH) 0.9 %
5-40 SYRINGE (ML) INJECTION EVERY 12 HOURS SCHEDULED
Status: DISCONTINUED | OUTPATIENT
Start: 2023-10-21 | End: 2023-10-22 | Stop reason: SDUPTHER

## 2023-10-21 RX ORDER — SODIUM CHLORIDE 0.9 % (FLUSH) 0.9 %
5-40 SYRINGE (ML) INJECTION EVERY 12 HOURS SCHEDULED
Status: DISCONTINUED | OUTPATIENT
Start: 2023-10-21 | End: 2023-10-27 | Stop reason: HOSPADM

## 2023-10-21 RX ORDER — ASPIRIN 300 MG/1
300 SUPPOSITORY RECTAL DAILY
Status: DISCONTINUED | OUTPATIENT
Start: 2023-10-22 | End: 2023-10-22

## 2023-10-21 RX ORDER — POLYETHYLENE GLYCOL 3350 17 G/17G
17 POWDER, FOR SOLUTION ORAL DAILY PRN
Status: DISCONTINUED | OUTPATIENT
Start: 2023-10-21 | End: 2023-10-27 | Stop reason: HOSPADM

## 2023-10-21 RX ADMIN — SODIUM CHLORIDE, PRESERVATIVE FREE 10 ML: 5 INJECTION INTRAVENOUS at 15:15

## 2023-10-21 RX ADMIN — SODIUM CHLORIDE: 9 INJECTION, SOLUTION INTRAVENOUS at 18:58

## 2023-10-21 RX ADMIN — IOPAMIDOL 100 ML: 755 INJECTION, SOLUTION INTRAVENOUS at 15:26

## 2023-10-21 RX ADMIN — Medication 20 MG: at 15:15

## 2023-10-21 RX ADMIN — SODIUM CHLORIDE, PRESERVATIVE FREE 10 ML: 5 INJECTION INTRAVENOUS at 21:14

## 2023-10-21 RX ADMIN — SODIUM CHLORIDE, PRESERVATIVE FREE 10 ML: 5 INJECTION INTRAVENOUS at 21:15

## 2023-10-21 RX ADMIN — Medication: at 22:27

## 2023-10-21 ASSESSMENT — PAIN SCALES - GENERAL
PAINLEVEL_OUTOF10: 0
PAINLEVEL_OUTOF10: 0

## 2023-10-21 NOTE — ED NOTES
Increased swelling to lower right lip.  [facility] [place] bedside to assess; will continue to monitor.      Luca Glynn, RN  10/21/23 1422 [facility] ,  Madison Almodovar RN  10/21/23 1098

## 2023-10-21 NOTE — ED PROVIDER NOTES
[facility] EMERGENCY DEPT  EMERGENCY DEPARTMENT ENCOUNTER       Pt Name: Freddie Love  MRN: [id number]  [address] [facility] 1955  Date of evaluation: 10/21/2023  Provider: Deon Solomon MD   PCP: Belle Sommers MD  Note Started: 2:46 PM EDT 10/21/23     CHIEF COMPLAINT       No chief complaint on file. HISTORY OF PRESENT ILLNESS: 1 or more elements      History From: Patient and EMS, History limited by: confusion     Freddie Love is a 70 y.o. female patient with history of embolic stroke, hypertension, hypercholesterolemia and left optic neuritis, here for initially right-sided weakness and now confusion. 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 This started within the last hour. According to EMS, the symptoms resolved within 5 minutes, but were followed by confusion. When patient was asked to show her left hand she pointed to the right hand. She knows her name, but states we are in [place] but cannot state what type of place were located in. She says she has a moderate headache on the right side. She denies trauma or neck pain. She denies any prior medical history, but her medical records indicates she has had a stroke in the past as well as hypertension etc.  She says she is not on a blood thinner, records indicate she is on a baby aspirin. She denies chest pain shortness of breath, numbness or tingling. She denies abdominal pain. Please See MDM for Additional Details of the HPI/PMH  Nursing Notes were all reviewed and agreed with or any disagreements were addressed in the HPI. REVIEW OF SYSTEMS        Positives and Pertinent negatives as per HPI. PAST HISTORY     Past Medical History:  Past Medical History:   Diagnosis Date    Embolic stroke ([address])     Heart murmur     Hypercholesteremia     Hypertension     Optic neuritis, left 2009     seen by Dr. Walker Colunga.        Past Surgical History:  Past Surgical History:

## 2023-10-21 NOTE — ED NOTES
Lower right lip swelling appears to be decreasing spontaneously; patient reports less swelling sensation as well.      Christiano Magallanes RN  10/21/23 6938

## 2023-10-21 NOTE — H&P
not include any procedural time which has been billed separately.   Shaina Batres MD  Intensivist  10/21/2023

## 2023-10-22 ENCOUNTER — APPOINTMENT (OUTPATIENT)
Facility: HOSPITAL | Age: 68
DRG: 063 | End: 2023-10-22
Payer: MEDICARE

## 2023-10-22 LAB
ANION GAP SERPL CALC-SCNC: 4 MMOL/L (ref 5–15)
BUN SERPL-MCNC: 21 MG/DL (ref 6–20)
BUN/CREAT SERPL: 18 (ref 12–20)
CALCIUM SERPL-MCNC: 8.4 MG/DL (ref 8.5–10.1)
CHLORIDE SERPL-SCNC: 112 MMOL/L (ref 97–108)
CHOLEST SERPL-MCNC: 136 MG/DL
CO2 SERPL-SCNC: 25 MMOL/L (ref 21–32)
CREAT SERPL-MCNC: 1.15 MG/DL (ref 0.55–1.02)
ERYTHROCYTE [DISTWIDTH] IN BLOOD BY AUTOMATED COUNT: 13.1 % (ref 11.5–14.5)
EST. AVERAGE GLUCOSE BLD GHB EST-MCNC: 128 MG/DL
GLUCOSE SERPL-MCNC: 126 MG/DL (ref 65–100)
HBA1C MFR BLD: 6.1 % (ref 4–5.6)
HCT VFR BLD AUTO: 40.5 % (ref 35–47)
HDLC SERPL-MCNC: 55 MG/DL
HDLC SERPL: 2.5 (ref 0–5)
HGB BLD-MCNC: 13.3 G/DL (ref 11.5–16)
LDLC SERPL CALC-MCNC: 55.2 MG/DL (ref 0–100)
MAGNESIUM SERPL-MCNC: 2.4 MG/DL (ref 1.6–2.4)
MCH RBC QN AUTO: 30.3 PG (ref 26–34)
MCHC RBC AUTO-ENTMCNC: 32.8 G/DL (ref 30–36.5)
MCV RBC AUTO: 92.3 FL (ref 80–99)
NRBC # BLD: 0 K/UL (ref 0–0.01)
NRBC BLD-RTO: 0 PER 100 WBC
P2Y12 PLT RESPONSE: 305 PRU (ref 194–418)
PHOSPHATE SERPL-MCNC: 4.1 MG/DL (ref 2.6–4.7)
PLATELET # BLD AUTO: 345 K/UL (ref 150–400)
PMV BLD AUTO: 9.1 FL (ref 8.9–12.9)
POTASSIUM SERPL-SCNC: 4.5 MMOL/L (ref 3.5–5.1)
RBC # BLD AUTO: 4.39 M/UL (ref 3.8–5.2)
SODIUM SERPL-SCNC: 141 MMOL/L (ref 136–145)
TRIGL SERPL-MCNC: 129 MG/DL
VLDLC SERPL CALC-MCNC: 25.8 MG/DL
WBC # BLD AUTO: 12.3 K/UL (ref 3.6–11)

## 2023-10-22 PROCEDURE — 80048 BASIC METABOLIC PNL TOTAL CA: CPT

## 2023-10-22 PROCEDURE — 86148 ANTI-PHOSPHOLIPID ANTIBODY: CPT

## 2023-10-22 PROCEDURE — 83036 HEMOGLOBIN GLYCOSYLATED A1C: CPT

## 2023-10-22 PROCEDURE — 2580000003 HC RX 258: Performed by: INTERNAL MEDICINE

## 2023-10-22 PROCEDURE — 70450 CT HEAD/BRAIN W/O DYE: CPT

## 2023-10-22 PROCEDURE — 80061 LIPID PANEL: CPT

## 2023-10-22 PROCEDURE — 6360000002 HC RX W HCPCS: Performed by: INTERNAL MEDICINE

## 2023-10-22 PROCEDURE — 85730 THROMBOPLASTIN TIME PARTIAL: CPT

## 2023-10-22 PROCEDURE — 2060000000 HC ICU INTERMEDIATE R&B

## 2023-10-22 PROCEDURE — 86147 CARDIOLIPIN ANTIBODY EA IG: CPT

## 2023-10-22 PROCEDURE — 85613 RUSSELL VIPER VENOM DILUTED: CPT

## 2023-10-22 PROCEDURE — 36415 COLL VENOUS BLD VENIPUNCTURE: CPT

## 2023-10-22 PROCEDURE — 6370000000 HC RX 637 (ALT 250 FOR IP): Performed by: INTERNAL MEDICINE

## 2023-10-22 PROCEDURE — 2700000000 HC OXYGEN THERAPY PER DAY

## 2023-10-22 PROCEDURE — 85597 PHOSPHOLIPID PLTLT NEUTRALIZ: CPT

## 2023-10-22 PROCEDURE — 97166 OT EVAL MOD COMPLEX 45 MIN: CPT

## 2023-10-22 PROCEDURE — 97116 GAIT TRAINING THERAPY: CPT

## 2023-10-22 PROCEDURE — 97162 PT EVAL MOD COMPLEX 30 MIN: CPT

## 2023-10-22 PROCEDURE — 85027 COMPLETE CBC AUTOMATED: CPT

## 2023-10-22 PROCEDURE — 97530 THERAPEUTIC ACTIVITIES: CPT

## 2023-10-22 PROCEDURE — 83735 ASSAY OF MAGNESIUM: CPT

## 2023-10-22 PROCEDURE — 86146 BETA-2 GLYCOPROTEIN ANTIBODY: CPT

## 2023-10-22 PROCEDURE — 2580000003 HC RX 258: Performed by: EMERGENCY MEDICINE

## 2023-10-22 PROCEDURE — 84100 ASSAY OF PHOSPHORUS: CPT

## 2023-10-22 PROCEDURE — 85576 BLOOD PLATELET AGGREGATION: CPT

## 2023-10-22 PROCEDURE — 99291 CRITICAL CARE FIRST HOUR: CPT | Performed by: INTERNAL MEDICINE

## 2023-10-22 RX ORDER — CLOPIDOGREL BISULFATE 75 MG/1
75 TABLET ORAL DAILY
Status: DISCONTINUED | OUTPATIENT
Start: 2023-10-22 | End: 2023-10-27 | Stop reason: HOSPADM

## 2023-10-22 RX ORDER — PANTOPRAZOLE SODIUM 40 MG/10ML
INJECTION, POWDER, LYOPHILIZED, FOR SOLUTION INTRAVENOUS
Status: DISPENSED
Start: 2023-10-22 | End: 2023-10-23

## 2023-10-22 RX ORDER — LISINOPRIL 20 MG/1
20 TABLET ORAL DAILY
Status: DISCONTINUED | OUTPATIENT
Start: 2023-10-22 | End: 2023-10-27 | Stop reason: HOSPADM

## 2023-10-22 RX ADMIN — SODIUM CHLORIDE, PRESERVATIVE FREE 10 ML: 5 INJECTION INTRAVENOUS at 08:10

## 2023-10-22 RX ADMIN — ATORVASTATIN CALCIUM 80 MG: 40 TABLET, FILM COATED ORAL at 22:15

## 2023-10-22 RX ADMIN — Medication 1 AMPULE: at 22:16

## 2023-10-22 RX ADMIN — Medication: at 22:18

## 2023-10-22 RX ADMIN — Medication: at 08:19

## 2023-10-22 RX ADMIN — SODIUM CHLORIDE, PRESERVATIVE FREE 10 ML: 5 INJECTION INTRAVENOUS at 22:17

## 2023-10-22 RX ADMIN — LISINOPRIL 20 MG: 20 TABLET ORAL at 18:20

## 2023-10-22 RX ADMIN — Medication 1 AMPULE: at 08:26

## 2023-10-22 RX ADMIN — ENOXAPARIN SODIUM 40 MG: 100 INJECTION SUBCUTANEOUS at 17:00

## 2023-10-22 RX ADMIN — SODIUM CHLORIDE, PRESERVATIVE FREE 10 ML: 5 INJECTION INTRAVENOUS at 08:20

## 2023-10-22 RX ADMIN — SODIUM CHLORIDE: 9 INJECTION, SOLUTION INTRAVENOUS at 05:14

## 2023-10-23 ENCOUNTER — APPOINTMENT (OUTPATIENT)
Facility: HOSPITAL | Age: 68
DRG: 063 | End: 2023-10-23
Attending: INTERNAL MEDICINE
Payer: MEDICARE

## 2023-10-23 LAB
ANION GAP SERPL CALC-SCNC: 4 MMOL/L (ref 5–15)
BUN SERPL-MCNC: 13 MG/DL (ref 6–20)
BUN/CREAT SERPL: 14 (ref 12–20)
CALCIUM SERPL-MCNC: 8.4 MG/DL (ref 8.5–10.1)
CHLORIDE SERPL-SCNC: 112 MMOL/L (ref 97–108)
CO2 SERPL-SCNC: 26 MMOL/L (ref 21–32)
CREAT SERPL-MCNC: 0.93 MG/DL (ref 0.55–1.02)
ERYTHROCYTE [DISTWIDTH] IN BLOOD BY AUTOMATED COUNT: 13.1 % (ref 11.5–14.5)
GLUCOSE SERPL-MCNC: 110 MG/DL (ref 65–100)
HCT VFR BLD AUTO: 34.4 % (ref 35–47)
HGB BLD-MCNC: 11.3 G/DL (ref 11.5–16)
MAGNESIUM SERPL-MCNC: 2.2 MG/DL (ref 1.6–2.4)
MCH RBC QN AUTO: 30.6 PG (ref 26–34)
MCHC RBC AUTO-ENTMCNC: 32.8 G/DL (ref 30–36.5)
MCV RBC AUTO: 93.2 FL (ref 80–99)
NRBC # BLD: 0 K/UL (ref 0–0.01)
NRBC BLD-RTO: 0 PER 100 WBC
PHOSPHATE SERPL-MCNC: 3.4 MG/DL (ref 2.6–4.7)
PLATELET # BLD AUTO: 293 K/UL (ref 150–400)
PMV BLD AUTO: 9.3 FL (ref 8.9–12.9)
POTASSIUM SERPL-SCNC: 3.9 MMOL/L (ref 3.5–5.1)
RBC # BLD AUTO: 3.69 M/UL (ref 3.8–5.2)
SODIUM SERPL-SCNC: 142 MMOL/L (ref 136–145)
WBC # BLD AUTO: 8.3 K/UL (ref 3.6–11)

## 2023-10-23 PROCEDURE — 85027 COMPLETE CBC AUTOMATED: CPT

## 2023-10-23 PROCEDURE — 80048 BASIC METABOLIC PNL TOTAL CA: CPT

## 2023-10-23 PROCEDURE — 6360000002 HC RX W HCPCS: Performed by: INTERNAL MEDICINE

## 2023-10-23 PROCEDURE — 2580000003 HC RX 258: Performed by: INTERNAL MEDICINE

## 2023-10-23 PROCEDURE — 2700000000 HC OXYGEN THERAPY PER DAY

## 2023-10-23 PROCEDURE — 2060000000 HC ICU INTERMEDIATE R&B

## 2023-10-23 PROCEDURE — 84100 ASSAY OF PHOSPHORUS: CPT

## 2023-10-23 PROCEDURE — 83735 ASSAY OF MAGNESIUM: CPT

## 2023-10-23 PROCEDURE — 36415 COLL VENOUS BLD VENIPUNCTURE: CPT

## 2023-10-23 PROCEDURE — 92523 SPEECH SOUND LANG COMPREHEN: CPT

## 2023-10-23 PROCEDURE — 6370000000 HC RX 637 (ALT 250 FOR IP): Performed by: INTERNAL MEDICINE

## 2023-10-23 PROCEDURE — 99232 SBSQ HOSP IP/OBS MODERATE 35: CPT | Performed by: PSYCHIATRY & NEUROLOGY

## 2023-10-23 RX ADMIN — SODIUM CHLORIDE, PRESERVATIVE FREE 10 ML: 5 INJECTION INTRAVENOUS at 20:34

## 2023-10-23 RX ADMIN — Medication 1 AMPULE: at 20:34

## 2023-10-23 RX ADMIN — Medication: at 10:26

## 2023-10-23 RX ADMIN — Medication 1 AMPULE: at 10:31

## 2023-10-23 RX ADMIN — Medication: at 20:34

## 2023-10-23 RX ADMIN — CLOPIDOGREL BISULFATE 75 MG: 75 TABLET ORAL at 10:25

## 2023-10-23 RX ADMIN — ATORVASTATIN CALCIUM 80 MG: 40 TABLET, FILM COATED ORAL at 20:33

## 2023-10-23 RX ADMIN — LISINOPRIL 20 MG: 20 TABLET ORAL at 10:25

## 2023-10-23 RX ADMIN — SODIUM CHLORIDE, PRESERVATIVE FREE 10 ML: 5 INJECTION INTRAVENOUS at 10:24

## 2023-10-23 RX ADMIN — ENOXAPARIN SODIUM 40 MG: 100 INJECTION SUBCUTANEOUS at 10:27

## 2023-10-23 ASSESSMENT — PAIN SCALES - GENERAL
PAINLEVEL_OUTOF10: 0

## 2023-10-23 NOTE — ACP (ADVANCE CARE PLANNING)
Advance Care Planning     Advance Care Planning Inpatient Note  [facility] Department    Today's Date: 10/23/2023  Unit: [facility] 2 PROGRESSIVE CARE    Received request from IDT Member. Upon review of chart and communication with care team, patient's decision making abilities are not in question. . Patient, Healthcare Decision Maker, and Friends was/were present in the room during visit. Goals of ACP Conversation:  Discuss advance care planning documents  Facilitate a discussion related to patient's goals of care as they align with the patient's values and beliefs. Health Care Decision Makers:       Primary Decision Maker: Fiona Gtz - Niece/Nephew - [phone]  Summary:  Completed New Documents     Advance Care Planning Documents (Patient Wishes):  Living Will/Advance Directive     Assessment:   was paged back to assist patient with AMD. Document completed and signed before  and patient's nurse. Original document and an extra copy returned to patient in her room. A copy placed on file to be scanned into her chat.       Interventions:  Assisted in the completion of documents according to patient's wishes at this time    Care Preferences Communicated:   No    Outcomes/Plan:  ACP Discussion: Completed    Electronically signed by Antonina Stubbs, [address] [place] [address] on 10/23/2023 at 4:08 PM

## 2023-10-23 NOTE — ACP (ADVANCE CARE PLANNING)
Advance Care Planning     Advance Care Planning Inpatient Note  [facility] Department    Today's Date: 10/23/2023  Unit: [facility] 2 PROGRESSIVE CARE    Received request from IDT Member. Upon review of chart and communication with care team, patient's decision making abilities are not in question. . Patient, Healthcare Decision Maker, and Friends was/were present in the room during visit. Goals of ACP Conversation:  Discuss advance care planning documents    Health Care Decision Makers:     No healthcare decision makers have been documented. Click here to complete [address] including selection of the Healthcare Decision Maker Relationship (ie \"Primary\")  Summary:  No Decision Maker named by patient at this time     Advance Care Planning Documents (Patient Wishes):  Living Will/Advance Directive     Assessment:  Responded to staff request for an Advance Medical Directive consult in 2320. Patient was alert and comfortable in bed, a niece and a friend were present. Patient expressed interest in consult so document was reviewed and a copy left at her request, for further review with family. Advised of chaplains availability for any further referrals.       Interventions:  Encouraged ongoing ACP conversation with future decision makers and loved ones    Care Preferences Communicated:   No    Outcomes/Plan:  ACP Discussion: Completed    Electronically signed by Wiley Azevedo [facility] on 10/23/2023 at 3:06 PM

## 2023-10-24 ENCOUNTER — APPOINTMENT (OUTPATIENT)
Facility: HOSPITAL | Age: 68
DRG: 063 | End: 2023-10-24
Attending: INTERNAL MEDICINE
Payer: MEDICARE

## 2023-10-24 LAB
ANION GAP SERPL CALC-SCNC: 4 MMOL/L (ref 5–15)
BUN SERPL-MCNC: 18 MG/DL (ref 6–20)
BUN/CREAT SERPL: 20 (ref 12–20)
CALCIUM SERPL-MCNC: 8.6 MG/DL (ref 8.5–10.1)
CHLORIDE SERPL-SCNC: 112 MMOL/L (ref 97–108)
CO2 SERPL-SCNC: 27 MMOL/L (ref 21–32)
CREAT SERPL-MCNC: 0.9 MG/DL (ref 0.55–1.02)
ECHO AO ROOT DIAM: 3.2 CM
ECHO AO ROOT INDEX: 1.76 CM/M2
ECHO AV AREA PEAK VELOCITY: 2.9 CM2
ECHO AV AREA/BSA PEAK VELOCITY: 1.6 CM2/M2
ECHO AV PEAK GRADIENT: 10 MMHG
ECHO AV PEAK VELOCITY: 1.6 M/S
ECHO AV VELOCITY RATIO: 0.94
ECHO BSA: 1.85 M2
ECHO LA DIAMETER INDEX: 1.81 CM/M2
ECHO LA DIAMETER: 3.3 CM
ECHO LA TO AORTIC ROOT RATIO: 1.03
ECHO LV FRACTIONAL SHORTENING: 34 % (ref 28–44)
ECHO LV INTERNAL DIMENSION DIASTOLE INDEX: 2.09 CM/M2
ECHO LV INTERNAL DIMENSION DIASTOLIC: 3.8 CM (ref 3.9–5.3)
ECHO LV INTERNAL DIMENSION SYSTOLIC INDEX: 1.37 CM/M2
ECHO LV INTERNAL DIMENSION SYSTOLIC: 2.5 CM
ECHO LV IVSD: 1.3 CM (ref 0.6–0.9)
ECHO LV MASS 2D: 163 G (ref 67–162)
ECHO LV MASS INDEX 2D: 89.6 G/M2 (ref 43–95)
ECHO LV POSTERIOR WALL DIASTOLIC: 1.2 CM (ref 0.6–0.9)
ECHO LV RELATIVE WALL THICKNESS RATIO: 0.63
ECHO LVOT AREA: 3.1 CM2
ECHO LVOT DIAM: 2 CM
ECHO LVOT PEAK GRADIENT: 9 MMHG
ECHO LVOT PEAK VELOCITY: 1.5 M/S
ECHO TV REGURGITANT MAX VELOCITY: 2.59 M/S
ECHO TV REGURGITANT PEAK GRADIENT: 27 MMHG
ERYTHROCYTE [DISTWIDTH] IN BLOOD BY AUTOMATED COUNT: 12.9 % (ref 11.5–14.5)
GLUCOSE SERPL-MCNC: 109 MG/DL (ref 65–100)
HCT VFR BLD AUTO: 35 % (ref 35–47)
HGB BLD-MCNC: 11.4 G/DL (ref 11.5–16)
MAGNESIUM SERPL-MCNC: 2.2 MG/DL (ref 1.6–2.4)
MCH RBC QN AUTO: 29.9 PG (ref 26–34)
MCHC RBC AUTO-ENTMCNC: 32.6 G/DL (ref 30–36.5)
MCV RBC AUTO: 91.9 FL (ref 80–99)
NRBC # BLD: 0 K/UL (ref 0–0.01)
NRBC BLD-RTO: 0 PER 100 WBC
PHOSPHATE SERPL-MCNC: 3.3 MG/DL (ref 2.6–4.7)
PLATELET # BLD AUTO: 284 K/UL (ref 150–400)
PMV BLD AUTO: 9.5 FL (ref 8.9–12.9)
POTASSIUM SERPL-SCNC: 3.9 MMOL/L (ref 3.5–5.1)
RBC # BLD AUTO: 3.81 M/UL (ref 3.8–5.2)
SODIUM SERPL-SCNC: 143 MMOL/L (ref 136–145)
WBC # BLD AUTO: 8.9 K/UL (ref 3.6–11)

## 2023-10-24 PROCEDURE — 83735 ASSAY OF MAGNESIUM: CPT

## 2023-10-24 PROCEDURE — 6370000000 HC RX 637 (ALT 250 FOR IP): Performed by: INTERNAL MEDICINE

## 2023-10-24 PROCEDURE — 84100 ASSAY OF PHOSPHORUS: CPT

## 2023-10-24 PROCEDURE — 97116 GAIT TRAINING THERAPY: CPT | Performed by: PHYSICAL THERAPIST

## 2023-10-24 PROCEDURE — 2060000000 HC ICU INTERMEDIATE R&B

## 2023-10-24 PROCEDURE — 6360000002 HC RX W HCPCS: Performed by: INTERNAL MEDICINE

## 2023-10-24 PROCEDURE — 97530 THERAPEUTIC ACTIVITIES: CPT

## 2023-10-24 PROCEDURE — 36415 COLL VENOUS BLD VENIPUNCTURE: CPT

## 2023-10-24 PROCEDURE — 80048 BASIC METABOLIC PNL TOTAL CA: CPT

## 2023-10-24 PROCEDURE — 93308 TTE F-UP OR LMTD: CPT

## 2023-10-24 PROCEDURE — 2580000003 HC RX 258: Performed by: INTERNAL MEDICINE

## 2023-10-24 PROCEDURE — 85027 COMPLETE CBC AUTOMATED: CPT

## 2023-10-24 RX ADMIN — Medication 1 AMPULE: at 08:59

## 2023-10-24 RX ADMIN — ENOXAPARIN SODIUM 40 MG: 100 INJECTION SUBCUTANEOUS at 08:59

## 2023-10-24 RX ADMIN — Medication 1 AMPULE: at 21:08

## 2023-10-24 RX ADMIN — Medication: at 21:07

## 2023-10-24 RX ADMIN — SODIUM CHLORIDE, PRESERVATIVE FREE 10 ML: 5 INJECTION INTRAVENOUS at 21:08

## 2023-10-24 RX ADMIN — CLOPIDOGREL BISULFATE 75 MG: 75 TABLET ORAL at 08:59

## 2023-10-24 RX ADMIN — Medication: at 09:01

## 2023-10-24 RX ADMIN — ATORVASTATIN CALCIUM 80 MG: 40 TABLET, FILM COATED ORAL at 21:07

## 2023-10-24 RX ADMIN — LISINOPRIL 20 MG: 20 TABLET ORAL at 08:59

## 2023-10-24 RX ADMIN — SODIUM CHLORIDE, PRESERVATIVE FREE 10 ML: 5 INJECTION INTRAVENOUS at 09:02

## 2023-10-24 ASSESSMENT — PAIN SCALES - GENERAL
PAINLEVEL_OUTOF10: 0

## 2023-10-25 PROCEDURE — 2060000000 HC ICU INTERMEDIATE R&B

## 2023-10-25 PROCEDURE — 92507 TX SP LANG VOICE COMM INDIV: CPT

## 2023-10-25 PROCEDURE — 6360000002 HC RX W HCPCS: Performed by: INTERNAL MEDICINE

## 2023-10-25 PROCEDURE — 6370000000 HC RX 637 (ALT 250 FOR IP): Performed by: INTERNAL MEDICINE

## 2023-10-25 PROCEDURE — 2580000003 HC RX 258: Performed by: INTERNAL MEDICINE

## 2023-10-25 RX ADMIN — Medication: at 21:10

## 2023-10-25 RX ADMIN — Medication: at 08:26

## 2023-10-25 RX ADMIN — CLOPIDOGREL BISULFATE 75 MG: 75 TABLET ORAL at 08:25

## 2023-10-25 RX ADMIN — SODIUM CHLORIDE, PRESERVATIVE FREE 10 ML: 5 INJECTION INTRAVENOUS at 08:26

## 2023-10-25 RX ADMIN — SODIUM CHLORIDE, PRESERVATIVE FREE 10 ML: 5 INJECTION INTRAVENOUS at 21:10

## 2023-10-25 RX ADMIN — Medication 1 AMPULE: at 21:11

## 2023-10-25 RX ADMIN — Medication 1 AMPULE: at 08:25

## 2023-10-25 RX ADMIN — LISINOPRIL 20 MG: 20 TABLET ORAL at 08:25

## 2023-10-25 RX ADMIN — ATORVASTATIN CALCIUM 80 MG: 40 TABLET, FILM COATED ORAL at 21:10

## 2023-10-25 RX ADMIN — ENOXAPARIN SODIUM 40 MG: 100 INJECTION SUBCUTANEOUS at 08:25

## 2023-10-25 ASSESSMENT — PAIN SCALES - GENERAL: PAINLEVEL_OUTOF10: 0

## 2023-10-25 NOTE — CONSULTS
Consult received for TEQUILA. Discussed with pt and sister at bedside. Plan for TEQUILA tomorrow afternoon. Keep NPO p MN. Consent signed and placed in chart. EJSSICA Fairchild NP  10/25/2023  2:16 PM    CHARLES DUNCAN - [facility]  [address] [place] [address]  [phone]  [email or web address]
Hospitalist Consult Note    NAME:   Freddie Love   : 1955   MRN: [id number]     Date/Time: 10/22/2023 5:33 PM    Patient PCP: Belle Sommers MD    ______________________________________________________________________  Given the patient's current clinical presentation, I have a high level of concern for decompensation if discharged from the emergency department. Complex decision making was performed, which includes reviewing the patient's available past medical records, laboratory results, and x-ray films. My assessment of this patient's clinical condition and my plan of care is as follows. Assessment / Plan:  Left frontal stroke  Aphasia  S/p TNK  History of prior strokes  MRI brain shows moderate sized acute infarction of the left frontal lobe without associated  hemorrhage, midline shift or mass effect. Chronic infarcts of the right frontal and left parietal lobes  Repeat CT head on 10/22 is negative for any acute bleed. Continue with Plavix, Lipitor. PT OT eval, speech swallow eval  All the patient is followed by neurology and appreciate their input. The patient will be transferred to stepdown today. Echocardiogram pending. Patient already has a loop recorder. A1c 6.1. Lipid panel within normal limits. HTN  HLD  Resume lisinopril that patient is at home. Continue with Lipitor. Medical Decision Making:   I personally reviewed labs: CBC, BMP, lipid panel, A1c  I personally reviewed imaging: CT head, CT angiogram brain and neck, MRI brain  I personally reviewed EKG:  Toxic drug monitoring: H&H as patient recently got TNK  Discussed case with: Intensivist. After discussion I am in agreement that acuity of patient's medical condition necessitates hospital stay.       Code Status: Full code  DVT Prophylaxis: Lovenox  Baseline: Lives alone, independent    Subjective:   CHIEF COMPLAINT: Aphasia/speech difficulty, left frontal stroke status post TNK    HISTORY OF PRESENT
S/p TNK. Etiology of stroke-cryptogenic.  - Recommend maintaining SBP <180/105 for 24 hrs from symptom onset and then BP goal is less than 140/90 (this is the long term goal)   -Obtain head CT noncontrast 24 hours post TNK to ensure no hemorrhage, if no hemorrhage, would recommend to start Plavix 75 mg daily, discontinue home aspirin. Should obtain P2Y12 assay after receiving Plavix to see if she is a responder. If not a responder then we will resume aspirin instead and may be consider increasing it to 325 mg daily.  -We will order antiphospholipid antibody  - Risk factor modification: would recommend obtaining lipid panel and A1c    - if LDL > 70, would start atorvastatin 40mg daily    - please check hepatic panel prior to initiation of statin  - please obtain TTE to rule out intracardiac thrombus and PFO, if unremarkable, would recommend considering TEQUILA and cardiology consultation.  -Patient is already wearing a loop monitor.  - would monitor on telemetry to rule out arrhythmias    - please obtain PT/OT and speech consultations     We discussed extensively the importance of lifestyle modification including smoking cessation, diet, and incorporating exercise into daily routine. Thank you very much for this consultation. Neurology will continue to follow this patient. I spent 60 minutes providing critical care to this acutely ill inpatient with > 50% of the time counseling as well as reviewing the patient's chart, notes, labs, medications and preparing documentation along with assisting in the coordination of care of the patient on the patient's hospital floor/unit.        Randall Rahman DO

## 2023-10-26 ENCOUNTER — APPOINTMENT (OUTPATIENT)
Facility: HOSPITAL | Age: 68
DRG: 063 | End: 2023-10-26
Payer: MEDICARE

## 2023-10-26 PROCEDURE — 97116 GAIT TRAINING THERAPY: CPT

## 2023-10-26 PROCEDURE — 97112 NEUROMUSCULAR REEDUCATION: CPT

## 2023-10-26 PROCEDURE — 6360000002 HC RX W HCPCS: Performed by: INTERNAL MEDICINE

## 2023-10-26 PROCEDURE — 2060000000 HC ICU INTERMEDIATE R&B

## 2023-10-26 PROCEDURE — 6370000000 HC RX 637 (ALT 250 FOR IP): Performed by: INTERNAL MEDICINE

## 2023-10-26 PROCEDURE — 2580000003 HC RX 258: Performed by: INTERNAL MEDICINE

## 2023-10-26 PROCEDURE — 94761 N-INVAS EAR/PLS OXIMETRY MLT: CPT

## 2023-10-26 PROCEDURE — 93325 DOPPLER ECHO COLOR FLOW MAPG: CPT

## 2023-10-26 RX ORDER — LIDOCAINE HYDROCHLORIDE 20 MG/ML
SOLUTION OROPHARYNGEAL PRN
Status: DISCONTINUED | OUTPATIENT
Start: 2023-10-26 | End: 2023-10-26

## 2023-10-26 RX ORDER — FENTANYL CITRATE 50 UG/ML
INJECTION, SOLUTION INTRAMUSCULAR; INTRAVENOUS PRN
Status: DISCONTINUED | OUTPATIENT
Start: 2023-10-26 | End: 2023-10-26

## 2023-10-26 RX ORDER — MIDAZOLAM HYDROCHLORIDE 1 MG/ML
INJECTION INTRAMUSCULAR; INTRAVENOUS PRN
Status: DISCONTINUED | OUTPATIENT
Start: 2023-10-26 | End: 2023-10-26

## 2023-10-26 RX ADMIN — BENZOCAINE, BUTAMBEN, AND TETRACAINE HYDROCHLORIDE 1 SPRAY: .028; .004; .004 AEROSOL, SPRAY TOPICAL at 12:54

## 2023-10-26 RX ADMIN — CLOPIDOGREL BISULFATE 75 MG: 75 TABLET ORAL at 08:34

## 2023-10-26 RX ADMIN — SODIUM CHLORIDE, PRESERVATIVE FREE 10 ML: 5 INJECTION INTRAVENOUS at 08:34

## 2023-10-26 RX ADMIN — LISINOPRIL 20 MG: 20 TABLET ORAL at 08:34

## 2023-10-26 RX ADMIN — Medication: at 20:37

## 2023-10-26 RX ADMIN — Medication 1 AMPULE: at 20:37

## 2023-10-26 RX ADMIN — LIDOCAINE HYDROCHLORIDE 10 ML: 20 SOLUTION ORAL at 12:53

## 2023-10-26 RX ADMIN — SODIUM CHLORIDE, PRESERVATIVE FREE 10 ML: 5 INJECTION INTRAVENOUS at 20:37

## 2023-10-26 RX ADMIN — Medication: at 08:35

## 2023-10-26 RX ADMIN — Medication 1 AMPULE: at 08:34

## 2023-10-26 RX ADMIN — ENOXAPARIN SODIUM 40 MG: 100 INJECTION SUBCUTANEOUS at 08:34

## 2023-10-26 RX ADMIN — FENTANYL CITRATE 25 MCG: 50 INJECTION, SOLUTION INTRAMUSCULAR; INTRAVENOUS at 12:56

## 2023-10-26 RX ADMIN — ATORVASTATIN CALCIUM 80 MG: 40 TABLET, FILM COATED ORAL at 20:36

## 2023-10-26 RX ADMIN — MIDAZOLAM HYDROCHLORIDE 1 MG: 1 INJECTION, SOLUTION INTRAMUSCULAR; INTRAVENOUS at 12:56

## 2023-10-27 VITALS
HEART RATE: 74 BPM | SYSTOLIC BLOOD PRESSURE: 123 MMHG | OXYGEN SATURATION: 98 % | TEMPERATURE: 97.8 F | DIASTOLIC BLOOD PRESSURE: 72 MMHG | BODY MASS INDEX: 27.49 KG/M2 | HEIGHT: 65 IN | WEIGHT: 165 LBS | RESPIRATION RATE: 18 BRPM

## 2023-10-27 LAB — ECHO BSA: 1.85 M2

## 2023-10-27 PROCEDURE — 2580000003 HC RX 258: Performed by: INTERNAL MEDICINE

## 2023-10-27 PROCEDURE — 6370000000 HC RX 637 (ALT 250 FOR IP): Performed by: INTERNAL MEDICINE

## 2023-10-27 PROCEDURE — 6360000002 HC RX W HCPCS: Performed by: INTERNAL MEDICINE

## 2023-10-27 RX ORDER — CLOPIDOGREL BISULFATE 75 MG/1
75 TABLET ORAL DAILY
Qty: 21 TABLET | Refills: 0 | Status: SHIPPED | OUTPATIENT
Start: 2023-10-28

## 2023-10-27 RX ORDER — ATORVASTATIN CALCIUM 80 MG/1
80 TABLET, FILM COATED ORAL NIGHTLY
Qty: 30 TABLET | Refills: 3 | Status: SHIPPED | OUTPATIENT
Start: 2023-10-27

## 2023-10-27 RX ADMIN — LISINOPRIL 20 MG: 20 TABLET ORAL at 09:26

## 2023-10-27 RX ADMIN — Medication: at 09:27

## 2023-10-27 RX ADMIN — SODIUM CHLORIDE, PRESERVATIVE FREE 10 ML: 5 INJECTION INTRAVENOUS at 09:27

## 2023-10-27 RX ADMIN — ENOXAPARIN SODIUM 40 MG: 100 INJECTION SUBCUTANEOUS at 09:27

## 2023-10-27 RX ADMIN — CLOPIDOGREL BISULFATE 75 MG: 75 TABLET ORAL at 09:27

## 2023-10-27 NOTE — DISCHARGE SUMMARY
daily  Start taking on: October 28, 2023            CHANGE how you take these medications      atorvastatin 80 MG tablet  Commonly known as: LIPITOR  Take 1 tablet by mouth nightly  What changed:   medication strength  how much to take  when to take this            CONTINUE taking these medications      aspirin 81 MG chewable tablet     lisinopril 20 MG tablet  Commonly known as: PRINIVIL;ZESTRIL               Where to Get Your Medications        These medications were sent to [facility]/pharmacy #[id number]- [place], [address] -  [phone]  [address] [place] [address]      Phone: [phone]   atorvastatin 80 MG tablet  clopidogrel 75 MG tablet             DISPOSITION:    Home with Family:       Home with HH/PT/OT/RN:    SNF/LTC: x   HTEE:    OTHER:            Code status: full  Recommended diet: regular diet  Recommended activity: activity as tolerated  Wound care: None      Follow up with:   PCP : MD Queta Clancyy,   [address] [facility] [address] [facility]  [phone]    Follow up in 6 week(s)      Margi Alberto MD  [address] [facility].  [facility]  [phone]    Follow up in 4 week(s)      Margi Alberto MD  [facility] [address] [place] [facility] [address] [phone]                Total time in minutes spent coordinating this discharge (includes going over instructions, follow-up, prescriptions, and preparing report for sign off to her PCP) :  35 minutes

## 2023-10-27 NOTE — PLAN OF CARE
Bedside and Verbal shift change report given to Al. (oncoming nurse) by Papi Babin (offgoing nurse). Report included the following information Adult Overview, Intake/Output, MAR, Recent Results, Med Rec Status, Alarm Parameters, and Quality Measures. Patient is A/O x4, calm, and cooperative. All extremity 5/5 and NIH score 1. Will continue to monitor. Problem: Discharge Planning  Goal: Discharge to home or other facility with appropriate resources  Outcome: Progressing  Flowsheets (Taken 10/26/2023 0724 by Francine Lane RN)  Discharge to home or other facility with appropriate resources: Identify barriers to discharge with patient and caregiver     Problem: Pain  Goal: Verbalizes/displays adequate comfort level or baseline comfort level  Outcome: Progressing     Problem: Skin/Tissue Integrity  Goal: Absence of new skin breakdown  Description: 1. Monitor for areas of redness and/or skin breakdown  2. Assess vascular access sites hourly  3. Every 4-6 hours minimum:  Change oxygen saturation probe site  4. Every 4-6 hours:  If on nasal continuous positive airway pressure, respiratory therapy assess nares and determine need for appliance change or resting period. Outcome: Progressing     Problem: Safety - Adult  Goal: Free from fall injury  Outcome: Progressing     Problem: Neurosensory - Adult  Goal: Achieves stable or improved neurological status  Outcome: Progressing  Flowsheets (Taken 10/26/2023 0724 by Francine Lane RN)  Achieves stable or improved neurological status: Assess for and report changes in neurological status  Goal: Absence of seizures  Outcome: Progressing  Flowsheets (Taken 10/26/2023 0724 by Francine Lane RN)  Absence of seizures: Monitor for seizure activity.   If seizure occurs, document type and location of movements and any associated apnea  Goal: Remains free of injury related to seizures activity  Outcome: Progressing  Flowsheets (Taken 10/26/2023
Problem: Discharge Planning  Goal: Discharge to home or other facility with appropriate resources  10/24/2023 0712 by Lillie Bray RN  Outcome: Progressing  10/23/2023 2041 by Aysha Caldera RN  Outcome: Progressing  Flowsheets (Taken 10/23/2023 2000 by Lillie Bray RN)  Discharge to home or other facility with appropriate resources:   Identify barriers to discharge with patient and caregiver   Arrange for needed discharge resources and transportation as appropriate   Identify discharge learning needs (meds, wound care, etc)   Refer to discharge planning if patient needs post-hospital services based on physician order or complex needs related to functional status, cognitive ability or social support system     Problem: Pain  Goal: Verbalizes/displays adequate comfort level or baseline comfort level  10/24/2023 0712 by Lillie Bray RN  Outcome: Progressing  10/23/2023 2041 by Aysha Caldera RN  Outcome: Progressing     Problem: Skin/Tissue Integrity  Goal: Absence of new skin breakdown  Description: 1. Monitor for areas of redness and/or skin breakdown  2. Assess vascular access sites hourly  3. Every 4-6 hours minimum:  Change oxygen saturation probe site  4. Every 4-6 hours:  If on nasal continuous positive airway pressure, respiratory therapy assess nares and determine need for appliance change or resting period.   Outcome: Progressing     Problem: Safety - Adult  Goal: Free from fall injury  10/24/2023 0712 by Lillie Bray RN  Outcome: Progressing  10/23/2023 2041 by Aysha Caldera RN  Outcome: Progressing     Problem: Neurosensory - Adult  Goal: Achieves stable or improved neurological status  Outcome: Progressing  Flowsheets (Taken 10/23/2023 2000)  Achieves stable or improved neurological status:   Assess for and report changes in neurological status   Initiate measures to prevent increased intracranial pressure   Maintain blood pressure and fluid volume within ordered parameters to
Problem: Discharge Planning  Goal: Discharge to home or other facility with appropriate resources  Outcome: Progressing     Problem: Pain  Goal: Verbalizes/displays adequate comfort level or baseline comfort level  Outcome: Progressing     Problem: Safety - Adult  Goal: Free from fall injury  Outcome: Progressing
Problem: Discharge Planning  Goal: Discharge to home or other facility with appropriate resources  Outcome: Progressing  Flowsheets (Taken 10/24/2023 1945)  Discharge to home or other facility with appropriate resources:   Identify barriers to discharge with patient and caregiver   Arrange for needed discharge resources and transportation as appropriate   Identify discharge learning needs (meds, wound care, etc)   Refer to discharge planning if patient needs post-hospital services based on physician order or complex needs related to functional status, cognitive ability or social support system     Problem: Pain  Goal: Verbalizes/displays adequate comfort level or baseline comfort level  Outcome: Progressing     Problem: Skin/Tissue Integrity  Goal: Absence of new skin breakdown  Description: 1. Monitor for areas of redness and/or skin breakdown  2. Assess vascular access sites hourly  3. Every 4-6 hours minimum:  Change oxygen saturation probe site  4. Every 4-6 hours:  If on nasal continuous positive airway pressure, respiratory therapy assess nares and determine need for appliance change or resting period. Outcome: Progressing     Problem: Safety - Adult  Goal: Free from fall injury  Outcome: Progressing     Problem: Neurosensory - Adult  Goal: Achieves stable or improved neurological status  Outcome: Progressing  Flowsheets (Taken 10/24/2023 1945)  Achieves stable or improved neurological status:   Assess for and report changes in neurological status   Initiate measures to prevent increased intracranial pressure   Maintain blood pressure and fluid volume within ordered parameters to optimize cerebral perfusion and minimize risk of hemorrhage   Monitor temperature, glucose, and sodium. Initiate appropriate interventions as ordered  Goal: Absence of seizures  Outcome: Progressing  Flowsheets (Taken 10/24/2023 1945)  Absence of seizures: Monitor for seizure activity.   If seizure occurs, document type and
Problem: Occupational Therapy - Adult  Goal: By Discharge: Performs self-care activities at highest level of function for planned discharge setting. See evaluation for individualized goals. Description: FUNCTIONAL STATUS PRIOR TO ADMISSION:  Patient was IND, active, working full time as . Patient lives alone but reports having many friends and family who can assist/supervise (friend who was present corroborated information). No DME use at baseline. Occupational Therapy Goals:  Initiated 10/22/2023  1. Patient will perform grooming with [place] within 7 day(s). 2.  Patient will perform bathing with Modified [place] within 7 day(s). 3.  Patient will perform lower body dressing with Modified [place] within 7 day(s). 4.  Patient will perform toilet transfers with Modified [place]  within 7 day(s). 5.  Patient will perform all aspects of toileting with Modified [place] within 7 day(s). 6.  Patient will participate in upper extremity therapeutic exercise/activities with [place] for 5 minutes within 7 day(s). 7.  Patient will utilize energy conservation techniques during functional activities with verbal cues within 7 day(s). Outcome: Progressing   OCCUPATIONAL THERAPY TREATMENT  Patient: Freddie Loev (68 y.o. female)  Date: 10/24/2023  Primary Diagnosis: Left sided numbness [R20.0]  Cerebrovascular accident (CVA) due to embolism of right middle cerebral artery ([address]) [I63.411]  Stroke aborted by administration of thrombolytic agent [facility]) [I63.9]       Precautions: Fall Risk                Chart, occupational therapy assessment, plan of care, and goals were reviewed. ASSESSMENT  Patient continues to benefit from skilled OT services and is progressing towards goals. Pt continues to demonstrate improvement with word finding, cognition, and standing balance, increasing safety and independence with ADLs and functional mobility.  Pt participated in higher
Problem: Physical Therapy - Adult  Goal: By Discharge: Performs mobility at highest level of function for planned discharge setting. See evaluation for individualized goals. Description: FUNCTIONAL STATUS PRIOR TO ADMISSION: Patient was independent and active without use of DME. 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 HOME SUPPORT PRIOR TO ADMISSION: The patient lives alone, 1-story with 3 steps to enter. Physical Therapy Goals  Initiated 10/22/2023  1. Patient will move from supine to sit and sit to supine in bed with supervision/set-up within 7 day(s). 2.  Patient will perform sit to stand with supervision/set-up within 7 day(s). 3.  Patient will transfer from bed to chair and chair to bed with supervision/set-up using the least restrictive device within 7 day(s). 4.  Patient will ambulate with supervision/set-up for 200 feet with the least restrictive device within 7 day(s). 5.  Patient will ascend/descend 3 stairs with no handrail(s) with supervision/set-up within 7 day(s). 6.  Patient will improve [place] Balance score by 7 points within 7 days. Outcome: Not Progressing     PHYSICAL THERAPY EVALUATION    Patient: Estefany Dooley (71 y.o. female)  Date: 10/22/2023  Primary Diagnosis: Left sided numbness [R20.0]  Cerebrovascular accident (CVA) due to embolism of right middle cerebral artery ([address]) [I63.411]  Stroke aborted by administration of thrombolytic agent [facility]) [I63.9]       Precautions: Fall Risk                    ASSESSMENT :   DEFICITS/IMPAIRMENTS:   The patient is limited by decreased functional mobility, strength, activity tolerance, safety awareness, command following, balance s/p CVA. Patient cleared by nursing to mobilize. Based on the impairments listed above the patient is below her prior level of function. She denied any vision or sensation changes today. Noted L  peripheral vision deficit upon testing.  She appears to still have some word finding
Problem: Physical Therapy - Adult  Goal: By Discharge: Performs mobility at highest level of function for planned discharge setting. See evaluation for individualized goals. Description: FUNCTIONAL STATUS PRIOR TO ADMISSION: Patient was independent and active without use of DME. 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 HOME SUPPORT PRIOR TO ADMISSION: The patient lives alone, 1-story with 3 steps to enter. Physical Therapy Goals  Initiated 10/22/2023  1. Patient will move from supine to sit and sit to supine in bed with supervision/set-up within 7 day(s). 2.  Patient will perform sit to stand with supervision/set-up within 7 day(s). 3.  Patient will transfer from bed to chair and chair to bed with supervision/set-up using the least restrictive device within 7 day(s). 4.  Patient will ambulate with supervision/set-up for 200 feet with the least restrictive device within 7 day(s). 5.  Patient will ascend/descend 3 stairs with no handrail(s) with supervision/set-up within 7 day(s). 6.  Patient will improve [place] Balance score by 7 points within 7 days. 10/22/2023 8371 by Patrick Arroyo PT  Outcome: Not Progressing     Problem: Discharge Planning  Goal: Discharge to home or other facility with appropriate resources  Outcome: Progressing     Problem: Pain  Goal: Verbalizes/displays adequate comfort level or baseline comfort level  Outcome: Progressing  Flowsheets (Taken 10/22/2023 2111)  Verbalizes/displays adequate comfort level or baseline comfort level: Encourage patient to monitor pain and request assistance     Problem: Skin/Tissue Integrity  Goal: Absence of new skin breakdown  Description: 1. Monitor for areas of redness and/or skin breakdown  2. Assess vascular access sites hourly  3. Every 4-6 hours minimum:  Change oxygen saturation probe site  4.   Every 4-6 hours:  If on nasal continuous positive airway pressure, respiratory therapy assess nares and determine
Problem: SLP Adult - Impaired Communication  Goal: By Discharge: Demonstrates communication skills at highest level of function for planned discharge setting. See evaluation for individualized goals. Description: Speech pathology goals initiated 10/23/25189   1. Patient will provide 3 salient features of a provided object/picture with 80% accuracy given mod cues  2. Patient will complete simple word retrieval tasks with 80% accuracy given min cues within 7 days  Outcome: Progressing   Speech LAnguage Pathology EVALUATION    Patient: Malik Manzanares (35 y.o. female)  Date: 10/23/2023  Primary Diagnosis: Left sided numbness [R20.0]  Cerebrovascular accident (CVA) due to embolism of right middle cerebral artery ([address]) [I63.411]  Stroke aborted by administration of thrombolytic agent [facility]) [I63.9]       Precautions:  Fall Risk                  ASSESSMENT :  Mri showing moderately sized acute L frontal CVA. Patient presents with expressive type aphasia characterized by word retrieval deficits. Patient able to name a variety of items around the room, and complete sentence completion tasks with mild deficits. Increased breakdowns with responsive naming task. Patient highly stimulable and benefits from sentence completion or phonemic cue. Educated on compensatory strategies such as describing (by using semantic features), making associations, gesturing  in order to assist with word retrieval.  She is highly motivated to participate in therapy. Patient is a  and language is an important piece to her day to day life. She will benefit from continued intensive ST services both during acute hospitalization and after. Patient will benefit from skilled intervention to address the above impairments.      PLAN :  Recommendations and Planned Interventions:  Diet: Regular and thin liquids  Intensive Speech therapy for aphasia      Acute SLP Services: Yes, patient will be followed by speech-language pathology
care: Encourage and assist patient to increase activity and self care with guidance from physical therapy/occupational therapy     Problem: ABCDS Injury Assessment  Goal: Absence of physical injury  Outcome: Adequate for Discharge  Flowsheets (Taken 10/27/2023 0759)  Absence of Physical Injury: Implement safety measures based on patient assessment     Problem: Chronic Conditions and Co-morbidities  Goal: Patient's chronic conditions and co-morbidity symptoms are monitored and maintained or improved  Outcome: Adequate for Discharge  Flowsheets (Taken 10/27/2023 7779)  Care Plan - Patient's Chronic Conditions and Co-Morbidity Symptoms are Monitored and Maintained or Improved: Monitor and assess patient's chronic conditions and comorbid symptoms for stability, deterioration, or improvement
deterioration   Update acute care plan with appropriate goals if chronic or comorbid symptoms are exacerbated and prevent overall improvement and discharge
Intervention(s):        Activity Tolerance:   Good    After treatment:   Patient left in no apparent distress sitting up in chair, Call bell within reach, and Caregiver / family present      COMMUNICATION/EDUCATION:   The patient's plan of care was discussed with: registered nurse    Patient Education  Education Given To: Patient  Education Provided: Fall Prevention Strategies  Education Method: Verbal  Education Outcome: Verbalized understanding      Johnnie Mena, PT  Minutes: 21
Level of Assistance: Contact-guard assistance  Distance (ft): 80 Feet (80' x 2)  Assistive Device: Gait belt  Interventions: Safety awareness training  Base of Support: Narrowed  Speed/Nandini: Pace decreased (< 100 feet/min)  Step Length: Left shortened;Right shortened  Rail Use: Right  Stairs - Level of Assistance: Contact-guard assistance  Number of Stairs Trained: 4  Neuro Re-Education:                    Pain Rating:    Pain Intervention(s):        Activity Tolerance:   Good    After treatment:   Patient left in no apparent distress sitting up in chair, Call bell within reach, and Caregiver / family present      COMMUNICATION/EDUCATION:   The patient's plan of care was discussed with: occupational therapist and registered nurse    Patient Education  Education Given To: Patient  Education Provided: Role of Therapy;Plan of Care  Education Method: Verbal  Barriers to Learning: Cognition  Education Outcome: Verbalized understanding;Continued education needed      Molly Barrientos, PT  Minutes: 18
diet changes were discussed with: Registered nurse    Patient/family have participated as able in goal setting and plan of care and Patient/family agree to work toward stated goals and plan of care    Thank you,  Martine Lopez SLP  Minutes: 32    Problem: SLP Adult - Impaired Communication  Goal: By Discharge: Demonstrates communication skills at highest level of function for planned discharge setting. See evaluation for individualized goals. Description: Speech pathology goals initiated 10/23/39592   1. Patient will provide 3 salient features of a provided object/picture with 80% accuracy given mod cues  2.  Patient will complete simple word retrieval tasks with 80% accuracy given min cues within 7 days  Outcome: Progressing
points   Mild = 48-66 points  LONNIE Moctezuma, CHRISTEN Bangura, & DENNY Champion (1992). Measurement of motor recovery after stroke: Outcome assessment and sample size requirements. Stroke, 23, pp. 9528-8773.   --------------------------------------------------------------------------------------------------------------------------------------------------------------------  [facility]:  Stroke:   Retia Chamber et al, 2001; n = 171; mean age 79 (6) years; assessed within 16 (12) days of stroke, Acute Stroke)  FMA Motor Scores from Admission to Discharge   10 point increase in FMA Upper Extremity = 1.5 change in discharge FIM   10 point increase in FMA Lower Extremity = 1.9 change in discharge FIM  MDC:   Stroke:   Amrik Paige et al, 2008, n = 14, mean age = 59.9 (14.6) years, assessed on average 14 (6.5) months post stroke, Chronic Stroke)   FMA = 5.2 points for the Upper Extremity portion of the assessment         Pain Ratin/10     Pain Intervention(s):   pain is at a level acceptable to the patient    Activity Tolerance:   Fair     After treatment:   Patient left in no apparent distress in bed, Call bell within reach, Bed/ chair alarm activated, Caregiver / family present, Side rails x3, and RN aware    COMMUNICATION/EDUCATION:   The patient's plan of care was discussed with: physical therapist and registered nurse    Patient Education  Education Given To: Patient; Other (Comment) (friend bedside.)  Education Provided: Role of Therapy;Plan of Care;Transfer Training; Fall Prevention Strategies  Education Provided Comments: BEFAST  Education Method: Verbal  Barriers to Learning: Cognition  Education Outcome: Verbalized understanding;Continued education needed    Thank you for this referral.  Makenzie Colunga OT  Minutes: 27    Occupational Therapy Evaluation Charge Determination   History Examination Decision-Making   LOW Complexity : Brief history review  LOW Complexity: 1-3 Performance deficits relating to

## 2023-10-27 NOTE — CARE COORDINATION
No further CM needs identified at this time. Pt is dischrging to [facility]. Transition of Care Plan:     RUR: 13% \"low risk\"  Prior Level of Functioning: Independent with ADL's   Disposition: IPR-Accepted to [facility]            Bed assignment: 2138                      Accepting provider: Dr. Maylin Marie                     Number for report: [phone]  If SNF or IPR: Date FOC offered: 10/23/2023  Date FOC received: 10/23/2023  Accepting facility: [facility]   Follow up appointments: To be arranged by facility  DME needed: Defer to IPR  Transportation at discharge: [facility] scheduled for 1pm  IM/IMM Medicare/ letter given: 2nd IM letter given; signed copy on chart  Is patient a  and connected with [facility]? N/A   Caregiver Contact: Pt's rich Alba [phone]  Discharge Caregiver contacted prior to discharge? Has been notified by patient  Care Conference needed? Not at this time  Barriers to discharge:  None    Update 1155 am: CM met with pt at bedside to discuss d/c planning. Pt is aware that she is [facility] to [facility]. Pt notified that transport will be at the hospital at 1300pm. 2nd IM letter given; signed copy on chart. Pt's sister given pt's room assignment, accepting provider and facility's phone number. No additional questions/concerns reported by pt and her sister. Assigned RN notified that CM has updated her note with accepting information and requested to call for reports. Assigned RN also notified that pt is an EMTALA. PCS copy on file. No further CM needs identified at this time. CM will remain accessible for  consult incase additional CM needs arise prior d/c.     Update 0930 am: CM has scheduled transport for 1pm with [facility]. CM will continue to follow up with d/c planning. Initial note: Chart reviewed for updates. CM contacted Yoel Ward admission liaison for [facility] [phone] to follow up on bed availability. CM was notified that pt will have a bed today.  Yoel Ward
Pt was accepted to [facility]. [facility] will have a bed available for pt tomorrow. Transition of Care Plan:     RUR: 13% \"low risk\"  Prior Level of Functioning: Independent with ADL's   Disposition: IPR-Accepted to [facility]  If SNF or IPR: Date FOC offered: 10/23/2023  Date FOC received: 10/23/2023  Accepting facility: [facility]   Follow up appointments: To be arranged by facility  DME needed: Defer to IPR  Transportation at discharge: [facility] scheduled for 1730 pm  IM/IMM Medicare/ letter given: 2nd IM letter needed at d/c  Is patient a  and connected with [facility]? N/A   Caregiver Contact: Pt's rich Mcclelland [phone]  Discharge Caregiver contacted prior to discharge? To be notified at d/c  Care Conference needed? Not at this time  Barriers to discharge:  None    Update 1440 pm: CM received a call from the MD reporting that pt's TEQUILA came back as normal and that pt is medically stable to go to [facility] today. CM notified MD that [facility] will not have a bed today since it was reported that pt will be discharging tomorrow. MD has requested for CM to update pt. CM met with pt at bedside to discuss d/c planning and give updates. Pt was notified that [facility] will not have a bed until tomorrow. Pt is fine with discharging tomorrow. CM will continue to follow up with d/c planning. Initial note: Chart reviewed for updates. It was discussed in IDR's that pt is still not medically cleared for d/c. Alice Galan liaison for [facility] [phone] was notified. CM will continue to follow up with d/c planning.     MEÑO Carvalho    [phone]
Pt was accepted to [facility] pending bed availability. Transition of Care Plan:    RUR: 14% \"low risk\"  Prior Level of Functioning: Independent with ADL's   Disposition: IPR-Accepted to [facility] pending bed availability  If SNF or IPR: Date FOC offered: 10/23/2023  Date FOC received: 10/23/2023  Accepting facility: [facility]   Follow up appointments: To be arranged by facility  DME needed: Defer to IPR  Transportation at discharge: [facility] transport  IM/IMM Medicare/ letter given: 2nd IM letter needed at d/c  Is patient a [employer] and connected with [place]? N/A   Caregiver Contact: Pt's rich Zarate [phone]  Discharge Caregiver contacted prior to discharge? To be notified at d/c  Care Conference needed? Not at this time  Barriers to discharge:  Medical clearance    Initial note: Chart reviewed for updates. It was discussed in IDR's that pt will likely discharge today after the ECHO or tomorrow. CM contacted [facility] the liaison for [facility] [phone] to follow up on bed availability. [facility] reports that they do not have a bed available for pt today. CM will continue to follow up with d/c planning.     MEÑO Ayala    [phone]
make needs known: Good   Family able to assist with home care needs: Yes   Would you like for me to discuss the discharge plan with any other family members/significant others, and if so, who? Yes   Financial Resources Medicare; Other (Comment)  (BCBS)   Community Resources None   Social/Functional History   Lives With Alone   Type of [address] [place] [address] One level   Home Access Stairs to enter with rails   Entrance Stairs - Number of Steps 3 at the back endtrance- 2 at the front [address]   Active  Yes   Mode of Transportation Car   Occupation Full time employment   Discharge Planning   Type of [address] [place] [address] Prior To Admission None   Potential Assistance Needed N/A   DME Ordered?  No   Patient expects to be discharged to: Acute rehab  ([facility])     MEÑO Small    [phone]
treatment preferences, and shares the quality data associated with the providers?   Yes  [facility] list for IPR given to pt)     MEÑO Schulte    [phone]

## 2023-10-27 NOTE — DISCHARGE INSTRUCTIONS
- continue aspirin   - continue plavix for 21 days   - continue statin   - follow up with primary physician

## 2023-10-31 LAB
APTT HEX PL PPP: 5 SEC
APTT IMM NP PPP: NORMAL SEC
APTT PPP 1:1 SALINE: NORMAL SEC
APTT PPP: 24.9 SEC
B2 GLYCOPROT1 IGA SER-ACNC: <10 SAU
B2 GLYCOPROT1 IGG SER-ACNC: 11 SGU
B2 GLYCOPROT1 IGM SER-ACNC: <10 SMU
CARDIOLIPIN IGA SER IA-ACNC: <10 APL
CARDIOLIPIN IGG SER IA-ACNC: <10 GPL
CARDIOLIPIN IGM SER IA-ACNC: <10 MPL
CONFIRM APTT: 0.1 SEC
CONFIRM DRVVT: NORMAL SEC
LABORATORY COMMENT REPORT: NORMAL
PROTHROM IGG SERPL-ACNC: 2 G UNITS
PS IGG SER IA-ACNC: 0 GPS
PS IGM SER IA-ACNC: 0 MPS
SCREEN DRVVT/NORMAL: NORMAL RATIO
SCREEN DRVVT: 40.2 SEC

## 2023-12-08 ENCOUNTER — OFFICE VISIT (OUTPATIENT)
Age: 68
End: 2023-12-08

## 2023-12-08 VITALS
RESPIRATION RATE: 18 BRPM | HEART RATE: 77 BPM | TEMPERATURE: 97.9 F | OXYGEN SATURATION: 99 % | BODY MASS INDEX: 28.19 KG/M2 | HEIGHT: 65 IN | DIASTOLIC BLOOD PRESSURE: 70 MMHG | SYSTOLIC BLOOD PRESSURE: 110 MMHG | WEIGHT: 169.2 LBS

## 2023-12-08 DIAGNOSIS — R20.0 LEFT LEG NUMBNESS: ICD-10-CM

## 2023-12-08 DIAGNOSIS — Z86.73 HISTORY OF STROKE: Primary | ICD-10-CM

## 2023-12-08 DIAGNOSIS — R26.89 BALANCE DISORDER: ICD-10-CM

## 2023-12-08 DIAGNOSIS — G47.9 SLEEP DISORDER: ICD-10-CM

## 2023-12-08 RX ORDER — VITAMIN B COMPLEX
1000 TABLET ORAL DAILY
COMMUNITY

## 2023-12-08 ASSESSMENT — PATIENT HEALTH QUESTIONNAIRE - PHQ9
SUM OF ALL RESPONSES TO PHQ QUESTIONS 1-9: 0
1. LITTLE INTEREST OR PLEASURE IN DOING THINGS: 0
SUM OF ALL RESPONSES TO PHQ QUESTIONS 1-9: 0
2. FEELING DOWN, DEPRESSED OR HOPELESS: 0
SUM OF ALL RESPONSES TO PHQ QUESTIONS 1-9: 0
SUM OF ALL RESPONSES TO PHQ9 QUESTIONS 1 & 2: 0
SUM OF ALL RESPONSES TO PHQ QUESTIONS 1-9: 0

## 2023-12-08 ASSESSMENT — ENCOUNTER SYMPTOMS
TROUBLE SWALLOWING: 0
EYES NEGATIVE: 1
BACK PAIN: 1
GASTROINTESTINAL NEGATIVE: 1

## 2023-12-28 PROCEDURE — 93298 REM INTERROG DEV EVAL SCRMS: CPT | Performed by: INTERNAL MEDICINE

## 2024-01-04 ENCOUNTER — HOSPITAL ENCOUNTER (OUTPATIENT)
Facility: HOSPITAL | Age: 69
Discharge: HOME | End: 2024-01-04
Attending: ORTHOPAEDIC SURGERY
Payer: MEDICARE

## 2024-01-04 DIAGNOSIS — M62.830 BACK MUSCLE SPASM: ICD-10-CM

## 2024-01-04 DIAGNOSIS — M51.36 DDD (DEGENERATIVE DISC DISEASE), LUMBAR: ICD-10-CM

## 2024-01-04 DIAGNOSIS — M54.16 LUMBAR RADICULOPATHY: ICD-10-CM

## 2024-01-04 DIAGNOSIS — M47.816 FACET ARTHROPATHY, LUMBAR: ICD-10-CM

## 2024-01-04 PROCEDURE — 72148 MRI LUMBAR SPINE W/O DYE: CPT

## 2024-02-21 ENCOUNTER — OFFICE VISIT (OUTPATIENT)
Age: 69
End: 2024-02-21
Payer: MEDICARE

## 2024-02-21 VITALS
HEART RATE: 76 BPM | TEMPERATURE: 98 F | BODY MASS INDEX: 29.47 KG/M2 | WEIGHT: 176.9 LBS | SYSTOLIC BLOOD PRESSURE: 113 MMHG | OXYGEN SATURATION: 99 % | HEIGHT: 65 IN | DIASTOLIC BLOOD PRESSURE: 68 MMHG

## 2024-02-21 DIAGNOSIS — I10 PRIMARY HYPERTENSION: ICD-10-CM

## 2024-02-21 DIAGNOSIS — G47.33 OBSTRUCTIVE SLEEP APNEA (ADULT) (PEDIATRIC): Primary | ICD-10-CM

## 2024-02-21 PROCEDURE — 1123F ACP DISCUSS/DSCN MKR DOCD: CPT | Performed by: INTERNAL MEDICINE

## 2024-02-21 PROCEDURE — 1090F PRES/ABSN URINE INCON ASSESS: CPT | Performed by: INTERNAL MEDICINE

## 2024-02-21 PROCEDURE — 1036F TOBACCO NON-USER: CPT | Performed by: INTERNAL MEDICINE

## 2024-02-21 PROCEDURE — G8417 CALC BMI ABV UP PARAM F/U: HCPCS | Performed by: INTERNAL MEDICINE

## 2024-02-21 PROCEDURE — 3017F COLORECTAL CA SCREEN DOC REV: CPT | Performed by: INTERNAL MEDICINE

## 2024-02-21 PROCEDURE — G8400 PT W/DXA NO RESULTS DOC: HCPCS | Performed by: INTERNAL MEDICINE

## 2024-02-21 PROCEDURE — G8484 FLU IMMUNIZE NO ADMIN: HCPCS | Performed by: INTERNAL MEDICINE

## 2024-02-21 PROCEDURE — 3078F DIAST BP <80 MM HG: CPT | Performed by: INTERNAL MEDICINE

## 2024-02-21 PROCEDURE — 99204 OFFICE O/P NEW MOD 45 MIN: CPT | Performed by: INTERNAL MEDICINE

## 2024-02-21 PROCEDURE — 3074F SYST BP LT 130 MM HG: CPT | Performed by: INTERNAL MEDICINE

## 2024-02-21 PROCEDURE — G8427 DOCREV CUR MEDS BY ELIG CLIN: HCPCS | Performed by: INTERNAL MEDICINE

## 2024-02-21 RX ORDER — ROSUVASTATIN CALCIUM 20 MG/1
1 TABLET, COATED ORAL NIGHTLY
COMMUNITY
Start: 2024-02-16 | End: 2024-05-16

## 2024-02-21 RX ORDER — GABAPENTIN 100 MG/1
200 CAPSULE ORAL NIGHTLY
COMMUNITY
Start: 2024-01-24

## 2024-02-21 ASSESSMENT — SLEEP AND FATIGUE QUESTIONNAIRES
HOW LIKELY ARE YOU TO NOD OFF OR FALL ASLEEP WHEN YOU ARE A PASSENGER IN A CAR FOR AN HOUR WITHOUT A BREAK: 0
HOW LIKELY ARE YOU TO NOD OFF OR FALL ASLEEP WHILE WATCHING TV: 1
HOW LIKELY ARE YOU TO NOD OFF OR FALL ASLEEP WHILE SITTING AND READING: 1
HOW LIKELY ARE YOU TO NOD OFF OR FALL ASLEEP IN A CAR, WHILE STOPPED FOR A FEW MINUTES IN TRAFFIC: 0
HOW LIKELY ARE YOU TO NOD OFF OR FALL ASLEEP WHILE LYING DOWN TO REST IN THE AFTERNOON WHEN CIRCUMSTANCES PERMIT: 2
ESS TOTAL SCORE: 5
HOW LIKELY ARE YOU TO NOD OFF OR FALL ASLEEP WHILE SITTING QUIETLY AFTER LUNCH WITHOUT ALCOHOL: 1
HOW LIKELY ARE YOU TO NOD OFF OR FALL ASLEEP WHILE SITTING INACTIVE IN A PUBLIC PLACE: 0
HOW LIKELY ARE YOU TO NOD OFF OR FALL ASLEEP WHILE SITTING AND TALKING TO SOMEONE: 0

## 2024-02-21 NOTE — PROGRESS NOTES
[address]  [place] [address]  Tel.  [phone]  Fax. [phone] [address]  [place] [address]  Tel.  [phone]  Fax. [phone] [address] [place] [address]  Tel.  [phone]  Fax. [phone]         Subjective:      Richa Jenkins is an 68 y.o. female referred for evaluation for a sleep disorder. She complains of snoring and occasional episodes where she will wake up gasping associated with concerned that she has sleep apnea and history of stroke.  Symptoms began several years ago, unchanged since that time. She usually can fall asleep in 20 minutes.  Family or house members note snoring. She denies falling asleep while driving.  Richa Jenkins does wake up frequently at night. She is not bothered by waking up too early and left unable to get back to sleep. She actually sleeps about 7 hours at night and wakes up about 3 (1-3) times during the night. She does not work shifts:  .   Richa Toribio indicates she does get too little sleep at night. Her bedtime is 2330. She awakens at 0700. She does not take naps.   . She has the following observed behaviors: Loud snoring, Light snoring; No other observations.  Other remarks: Waking with a gasp or a snort        2/21/2024    10:49 AM 2/21/2024    10:46 AM   Sleep Medicine   Sitting and reading 1    Watching TV 1    Sitting, inactive in a public place (e.g. a theatre or a meeting) 0    As a passenger in a car for an hour without a break 0    Lying down to rest in the afternoon when circumstances permit 2    Sitting and talking to someone 0    Sitting quietly after a lunch without alcohol 1    In a car, while stopped for a few minutes in traffic 0    Cushman Sleepiness Score 5    Neck circumference (Inches)  12.5       Allergies   Allergen Reactions    Erythromycin Rash    Penicillins Rash         Current Outpatient Medications:     rosuvastatin (CRESTOR) 20 MG tablet, Take 1 tablet by

## 2024-02-21 NOTE — PATIENT INSTRUCTIONS
label. Alcohol naturally makes you sleepy and on its own can cause accidents. Combined with excessive drowsiness its effects are amplified.   Signs of Drowsy Driving:   * You don't remember driving the last few miles   * You may drift out of your suzy   * You are unable to focus and your thoughts wander   * You may yawn more often than normal   * You have difficulty keeping your eyes open / nodding off   * Missing traffic signs, speeding, or tailgating  Prevention-   Good sleep hygiene, lifestyle and behavioral choices have the most impact on drowsy driving. There is no substitute for sleep and the average person requires 8 hours nightly. If you find yourself driving drowsy, stop and sleep. Consider the sleep hygiene tips provided during your visit as well.     Medication Refill Policy: Refills for all medications require 1 week advance notice. Please have your pharmacy fax a refill request. We are unable to fax, or call in \"controled substance\" medications and you will need to pick these prescriptions up from our office.

## 2024-02-29 ENCOUNTER — HOSPITAL ENCOUNTER (OUTPATIENT)
Facility: HOSPITAL | Age: 69
Discharge: HOME | End: 2024-03-03
Payer: MEDICARE

## 2024-02-29 ENCOUNTER — PROCEDURE VISIT (OUTPATIENT)
Age: 69
End: 2024-02-29

## 2024-02-29 DIAGNOSIS — G47.33 OSA (OBSTRUCTIVE SLEEP APNEA): Primary | ICD-10-CM

## 2024-02-29 DIAGNOSIS — G47.33 OBSTRUCTIVE SLEEP APNEA (ADULT) (PEDIATRIC): ICD-10-CM

## 2024-02-29 PROCEDURE — 95800 SLP STDY UNATTENDED: CPT | Performed by: INTERNAL MEDICINE

## 2024-02-29 NOTE — PROGRESS NOTES
S>Richa Jenkins is a 68 y.o. female seen today to receive a home sleep testing unit (WatchPAT).    Patient was educated on proper hookup and operation of the WatchPAT HST via detailed instruction sheet .  Instruction forms with after hours contact and documentation were signed.    O>    There were no vitals taken for this visit.      A>  No diagnosis found.      P>  General information regarding operations and maintenance of the device was provided.  Follow-up appointment was made to return the WatchPAT HST. She will be contacted once the results have been reviewed.  She was asked to contact our office for any problems regarding her home sleep test study.

## 2024-03-04 ENCOUNTER — TELEPHONE (OUTPATIENT)
Age: 69
End: 2024-03-04

## 2024-03-04 DIAGNOSIS — G47.33 OBSTRUCTIVE SLEEP APNEA (ADULT) (PEDIATRIC): Primary | ICD-10-CM

## 2024-03-12 NOTE — TELEPHONE ENCOUNTER
HSAT in r-drive.    Tech to convey results to patient    Watchpat HSAT positive for significant sleep apnea.       AHI 16/hour and lowest oxygen saturation was 69%. We had discussed treatment options at initial consultation. Based on the results of the home sleep apnea test,  PAP therapy indicated.    I recommend that she return to the sleep center for an attended PAP titration where we will be able to find the pressure setting that best controls her sleep apnea and allows her the opportunity to adjust to PAP therapy. Mask options will be presented at this time. Once results reviewed, I will order a PAP device for her to use at home and we will see her in follow-up about 6-8 weeks after initiation of PAP.  she will be called with results.

## 2024-03-30 PROCEDURE — 93298 REM INTERROG DEV EVAL SCRMS: CPT | Performed by: INTERNAL MEDICINE

## 2024-05-14 ENCOUNTER — TELEPHONE (OUTPATIENT)
Facility: HOSPITAL | Age: 69
End: 2024-05-14

## 2024-05-20 ENCOUNTER — CLINICAL DOCUMENTATION (OUTPATIENT)
Age: 69
End: 2024-05-20

## 2024-05-20 NOTE — PROGRESS NOTES
Per email sent from ANMOL Rebollar:  The patient arrived at the sleep center on 5/16/24 When she was put her in her room, the pt was  asked her if she has ever been on cpap and she said no and that she couldn't wear it. She stated she was under the impression that she was not going to be wearing cpap. It was explained to her, the order was for a cpap titration and she again stated she could not wear it. She stated that someone told her that she could wear something that goes in her nose and tech stated they were probably talking about nasal pillow mask that is hooked up to cpap. She said no she wasn't wearing it.  Patient left without being hooked up.

## 2024-06-10 ENCOUNTER — TELEPHONE (OUTPATIENT)
Age: 69
End: 2024-06-10

## 2024-06-10 ENCOUNTER — OFFICE VISIT (OUTPATIENT)
Age: 69
End: 2024-06-10
Payer: MEDICARE

## 2024-06-10 VITALS
OXYGEN SATURATION: 97 % | HEIGHT: 65 IN | DIASTOLIC BLOOD PRESSURE: 72 MMHG | HEART RATE: 69 BPM | BODY MASS INDEX: 30.19 KG/M2 | RESPIRATION RATE: 18 BRPM | WEIGHT: 181.2 LBS | SYSTOLIC BLOOD PRESSURE: 112 MMHG

## 2024-06-10 DIAGNOSIS — R20.0 LEFT LEG NUMBNESS: ICD-10-CM

## 2024-06-10 DIAGNOSIS — R26.89 BALANCE DISORDER: Primary | ICD-10-CM

## 2024-06-10 DIAGNOSIS — Z86.73 HISTORY OF STROKE: ICD-10-CM

## 2024-06-10 PROCEDURE — 99213 OFFICE O/P EST LOW 20 MIN: CPT | Performed by: NURSE PRACTITIONER

## 2024-06-10 PROCEDURE — G8427 DOCREV CUR MEDS BY ELIG CLIN: HCPCS | Performed by: NURSE PRACTITIONER

## 2024-06-10 PROCEDURE — 3017F COLORECTAL CA SCREEN DOC REV: CPT | Performed by: NURSE PRACTITIONER

## 2024-06-10 PROCEDURE — G8417 CALC BMI ABV UP PARAM F/U: HCPCS | Performed by: NURSE PRACTITIONER

## 2024-06-10 PROCEDURE — 1123F ACP DISCUSS/DSCN MKR DOCD: CPT | Performed by: NURSE PRACTITIONER

## 2024-06-10 PROCEDURE — 1036F TOBACCO NON-USER: CPT | Performed by: NURSE PRACTITIONER

## 2024-06-10 PROCEDURE — 1090F PRES/ABSN URINE INCON ASSESS: CPT | Performed by: NURSE PRACTITIONER

## 2024-06-10 PROCEDURE — G8400 PT W/DXA NO RESULTS DOC: HCPCS | Performed by: NURSE PRACTITIONER

## 2024-06-10 RX ORDER — ATORVASTATIN CALCIUM 20 MG/1
20 TABLET, FILM COATED ORAL DAILY
COMMUNITY

## 2024-06-10 ASSESSMENT — PATIENT HEALTH QUESTIONNAIRE - PHQ9
SUM OF ALL RESPONSES TO PHQ QUESTIONS 1-9: 0
1. LITTLE INTEREST OR PLEASURE IN DOING THINGS: NOT AT ALL
SUM OF ALL RESPONSES TO PHQ QUESTIONS 1-9: 0
SUM OF ALL RESPONSES TO PHQ9 QUESTIONS 1 & 2: 0
SUM OF ALL RESPONSES TO PHQ QUESTIONS 1-9: 0
SUM OF ALL RESPONSES TO PHQ QUESTIONS 1-9: 0
2. FEELING DOWN, DEPRESSED OR HOPELESS: NOT AT ALL

## 2024-06-10 ASSESSMENT — ENCOUNTER SYMPTOMS
TROUBLE SWALLOWING: 0
EYES NEGATIVE: 1

## 2024-06-10 NOTE — TELEPHONE ENCOUNTER
Patient came into the office to ask what her next steps are. She was unable to complete Titration study.      Please review note from tech.

## 2024-06-10 NOTE — PROGRESS NOTES
Chief Complaint   Patient presents with    Neurologic Problem     Hx of CVA- doing well      1. Have you been to the ER, urgent care clinic since your last visit?  Hospitalized since your last visit? No     2. Have you seen or consulted any other health care providers outside of the [facility] since your last visit?  Include any pap smears or colon screening.  Yes [facility]

## 2024-06-10 NOTE — PROGRESS NOTES
[facility]  [address]  [address]  [place] [address]  Tel: [phone]  Fax: [phone]      Date:  06/10/24     Name:  JUDE GIVENS  :  1955  MRN:  [id number]     PCP:  Dylan Swann MD    Chief Complaint   Patient presents with    Neurologic Problem     Hx of CVA- doing well        HISTORY OF PRESENT ILLNESS:  Patient presents today for regular follow up,last seen 2023.  Patient notes overall she is doing quite well.  She was Hospitalized from 10/21/2023 - 10/27/2023 for acute stroke.  PCP Dr Swann regular follow-up appointments .  PT, OT, ST; finished and then she continued doing outpatient therapy for her left leg, she is still having the numbness. Doing weight training and aquatics. Seems to be helping some. She will go for a total of 12 weeks, in the [facility] EX program. She wants to go to the Gait center as she notes she still has issues with her balance.  No falls.  Sleep study: she did have the sleep test done, they wanted her to go to the Clinic to do the testing. She was there about 2 weeks ago, she is having trouble getting in touch with the office to reschedule.         Recap from last visit : 1.  History of stroke: All diagnostics from her hospitalization were reviewed with patient, she is to continue aspirin 81 mg daily as well as Lipitor 80 mg and lisinopril 20 mg daily.  Stroke risk factors reviewed with patient.  Healthy lifestyle encouraged, she is continue to participate in PT, OT, as well as speech therapy at [facility].  Advised patient she is unable to drive until she is able to complete the formal driving assessment at [facility].  Advised that would be the only way we would be able to clear her to drive sooner than the 6 months post stroke.  2.  Left leg numbness: Patient notes that prior to her stroke she was to be evaluated by orthopedics, advised patient to reschedule the appointment with them for lumbar MRI.

## 2024-06-12 ENCOUNTER — CLINICAL DOCUMENTATION (OUTPATIENT)
Age: 69
End: 2024-06-12

## 2024-06-12 ENCOUNTER — TELEPHONE (OUTPATIENT)
Age: 69
End: 2024-06-12

## 2024-06-12 ENCOUNTER — TELEMEDICINE (OUTPATIENT)
Age: 69
End: 2024-06-12
Payer: MEDICARE

## 2024-06-12 DIAGNOSIS — Z86.73 H/O: STROKE: ICD-10-CM

## 2024-06-12 DIAGNOSIS — G47.33 OBSTRUCTIVE SLEEP APNEA (ADULT) (PEDIATRIC): Primary | ICD-10-CM

## 2024-06-12 PROCEDURE — 1123F ACP DISCUSS/DSCN MKR DOCD: CPT | Performed by: NURSE PRACTITIONER

## 2024-06-12 PROCEDURE — 99214 OFFICE O/P EST MOD 30 MIN: CPT | Performed by: NURSE PRACTITIONER

## 2024-06-12 PROCEDURE — G8427 DOCREV CUR MEDS BY ELIG CLIN: HCPCS | Performed by: NURSE PRACTITIONER

## 2024-06-12 PROCEDURE — G8400 PT W/DXA NO RESULTS DOC: HCPCS | Performed by: NURSE PRACTITIONER

## 2024-06-12 PROCEDURE — G8417 CALC BMI ABV UP PARAM F/U: HCPCS | Performed by: NURSE PRACTITIONER

## 2024-06-12 PROCEDURE — 1036F TOBACCO NON-USER: CPT | Performed by: NURSE PRACTITIONER

## 2024-06-12 PROCEDURE — 1090F PRES/ABSN URINE INCON ASSESS: CPT | Performed by: NURSE PRACTITIONER

## 2024-06-12 PROCEDURE — 3017F COLORECTAL CA SCREEN DOC REV: CPT | Performed by: NURSE PRACTITIONER

## 2024-06-12 ASSESSMENT — SLEEP AND FATIGUE QUESTIONNAIRES
HOW LIKELY ARE YOU TO NOD OFF OR FALL ASLEEP WHILE SITTING QUIETLY AFTER LUNCH WITHOUT ALCOHOL: WOULD NEVER DOZE
HOW LIKELY ARE YOU TO NOD OFF OR FALL ASLEEP WHILE LYING DOWN TO REST IN THE AFTERNOON WHEN CIRCUMSTANCES PERMIT: WOULD NEVER DOZE
HOW LIKELY ARE YOU TO NOD OFF OR FALL ASLEEP WHEN YOU ARE A PASSENGER IN A CAR FOR AN HOUR WITHOUT A BREAK: WOULD NEVER DOZE
HOW LIKELY ARE YOU TO NOD OFF OR FALL ASLEEP WHILE SITTING INACTIVE IN A PUBLIC PLACE: WOULD NEVER DOZE
HOW LIKELY ARE YOU TO NOD OFF OR FALL ASLEEP WHILE WATCHING TV: SLIGHT CHANCE OF DOZING
HOW LIKELY ARE YOU TO NOD OFF OR FALL ASLEEP WHILE SITTING AND TALKING TO SOMEONE: WOULD NEVER DOZE
HOW LIKELY ARE YOU TO NOD OFF OR FALL ASLEEP IN A CAR, WHILE STOPPED FOR A FEW MINUTES IN TRAFFIC: WOULD NEVER DOZE
ESS TOTAL SCORE: 2
HOW LIKELY ARE YOU TO NOD OFF OR FALL ASLEEP WHILE SITTING AND READING: SLIGHT CHANCE OF DOZING

## 2024-06-12 NOTE — PROGRESS NOTES
Richa Jenkins (: 1955) is a 68 y.o. female, established patient, seen for positive airway pressure follow-up. she was last seen by Dr. Simmons on 2024, prior notes and sleep testing reviewed in detail.  Home sleep test 2024 showed AHI of 16.2/hr with a lowest SpO2 of 69%, duration of SpO2 < 88% 20.2 min. Weight at time of sleep testing 176 pounds.  In lab titration ordered but not completed.      ASSESSMENT/PLAN:   Diagnosis Orders   1. Obstructive sleep apnea (adult) (pediatric)  Amb External Referral To Dentistry      2. H/O: stroke        3. BMI 30.0-30.9,adult            AHI = 16.2(2024).      Patient has a history and examination consistent with the diagnosis of sleep apnea.  She was scheduled for titration but not able to complete due to concerns for claustrophobia.  She is interested in oral appliance therapy as alternative.    Follow-up and Dispositions    Return if symptoms worsen or fail to improve.         1 - Sleep apnea -  she was provided information on sleep apnea including corresponding risk factors and the importance of proper treatment. Treatment options were reviewed in detail, PAP therapy explained as gold standard treatment, patient understands and prefers to proceed with Oral Appliance therapy. Patient will be seen in follow-up in 3-6 months after OAT setup to gauge treatment response with HSAT.  Referral to dentistry.    Orders Placed This Encounter   Procedures    Amb External Referral To Dentistry     Referral Priority:   Routine     Referral Type:   Consult for Advice and Opinion     Referral Reason:   Specialty Services Required     Referred to Provider:   Ian Traylor DDS     Requested Specialty:   Dentistry     Number of Visits Requested:   1       2. H/O Stroke - continue on her current regimen.  I have reviewed the relationship between stroke and sleep disordered breathing.    3. Encouraged continued weight management program through appropriate diet and

## 2024-06-12 NOTE — TELEPHONE ENCOUNTER
Return if symptoms worsen or fail to improve.   Called patient left a voice message to inform referral to Dentistry was faxed to Dr. Ian Traylor Office.  And to schedule an appointment with his office.  Phone  # [phone]

## 2025-07-17 ENCOUNTER — OFFICE VISIT (OUTPATIENT)
Age: 70
End: 2025-07-17
Payer: MEDICARE

## 2025-07-17 VITALS
SYSTOLIC BLOOD PRESSURE: 128 MMHG | DIASTOLIC BLOOD PRESSURE: 80 MMHG | RESPIRATION RATE: 18 BRPM | BODY MASS INDEX: 29.66 KG/M2 | OXYGEN SATURATION: 97 % | WEIGHT: 178 LBS | HEIGHT: 65 IN | HEART RATE: 84 BPM

## 2025-07-17 DIAGNOSIS — M51.361 DEGENERATION OF INTERVERTEBRAL DISC OF LUMBAR REGION WITH LOWER EXTREMITY PAIN: ICD-10-CM

## 2025-07-17 DIAGNOSIS — R26.89 BALANCE DISORDER: ICD-10-CM

## 2025-07-17 DIAGNOSIS — I69.854 HEMIPARESIS OF LEFT NONDOMINANT SIDE AS LATE EFFECT OF OTHER CEREBROVASCULAR DISEASE (HCC): ICD-10-CM

## 2025-07-17 DIAGNOSIS — Z86.73 HISTORY OF STROKE: Primary | ICD-10-CM

## 2025-07-17 PROCEDURE — 1159F MED LIST DOCD IN RCRD: CPT | Performed by: NURSE PRACTITIONER

## 2025-07-17 PROCEDURE — 99214 OFFICE O/P EST MOD 30 MIN: CPT | Performed by: NURSE PRACTITIONER

## 2025-07-17 PROCEDURE — 1160F RVW MEDS BY RX/DR IN RCRD: CPT | Performed by: NURSE PRACTITIONER

## 2025-07-17 PROCEDURE — 1036F TOBACCO NON-USER: CPT | Performed by: NURSE PRACTITIONER

## 2025-07-17 PROCEDURE — 3017F COLORECTAL CA SCREEN DOC REV: CPT | Performed by: NURSE PRACTITIONER

## 2025-07-17 PROCEDURE — G8400 PT W/DXA NO RESULTS DOC: HCPCS | Performed by: NURSE PRACTITIONER

## 2025-07-17 PROCEDURE — G8419 CALC BMI OUT NRM PARAM NOF/U: HCPCS | Performed by: NURSE PRACTITIONER

## 2025-07-17 PROCEDURE — 1125F AMNT PAIN NOTED PAIN PRSNT: CPT | Performed by: NURSE PRACTITIONER

## 2025-07-17 PROCEDURE — 1090F PRES/ABSN URINE INCON ASSESS: CPT | Performed by: NURSE PRACTITIONER

## 2025-07-17 PROCEDURE — G8427 DOCREV CUR MEDS BY ELIG CLIN: HCPCS | Performed by: NURSE PRACTITIONER

## 2025-07-17 PROCEDURE — 1123F ACP DISCUSS/DSCN MKR DOCD: CPT | Performed by: NURSE PRACTITIONER

## 2025-07-17 RX ORDER — GABAPENTIN 300 MG/1
300 CAPSULE ORAL 3 TIMES DAILY
COMMUNITY
Start: 2025-05-29

## 2025-07-17 ASSESSMENT — PATIENT HEALTH QUESTIONNAIRE - PHQ9
SUM OF ALL RESPONSES TO PHQ QUESTIONS 1-9: 0
1. LITTLE INTEREST OR PLEASURE IN DOING THINGS: NOT AT ALL
SUM OF ALL RESPONSES TO PHQ QUESTIONS 1-9: 0
2. FEELING DOWN, DEPRESSED OR HOPELESS: NOT AT ALL

## 2025-07-17 ASSESSMENT — ENCOUNTER SYMPTOMS
BACK PAIN: 1
EYES NEGATIVE: 1

## 2025-07-17 NOTE — PROGRESS NOTES
Chief Complaint   Patient presents with    Neurologic Problem     Hx of CVA -continues with numbness in left   Also having weakness in left arm     Balance issues      1. Have you been to the ER, urgent care clinic since your last visit?  Hospitalized since your last visit? No     2. Have you seen or consulted any other health care providers outside of the [facility] since your last visit?  Include any pap smears or colon screening. Yes Dr Mukherjee - Ortho   Therapist with [facility] -

## 2025-07-17 NOTE — PROGRESS NOTES
[facility]  [address]  [address]  [place] [address]  Tel: [phone]  Fax: [phone]      Date:  25     Name:  JUDE GIVENS  :  1955  MRN:  [id number]     PCP:  Dylan Swann MD    Chief Complaint   Patient presents with    Neurologic Problem     Hx of CVA -continues with numbness in left   Also having weakness in left arm     Balance issues        HISTORY OF PRESENT ILLNESS:  History of Present Illness  The patient is a 69-year-old female here today for a regular follow-up appointment. The primary reason for this visit is to review her ongoing symptoms and progress since her last visit in 2024. She has a notable history of stroke from 10/2023 and is followed closely by her primary care physician. At the time of the last visit, orders were renewed for her to resume physical therapy due to issues with her left lower extremity.    Since the last visit, she continues to experience numbness on her left side and weakness in her left hand. Her primary care physician referred her to multiple specialists to determine if the numbness was related to her back. A  nerve conduction test were performed in 2025, both of which showed no abnormalities. She was informed that it might take some time for her brain to fully recover from the stroke. She is right-handed and reports no pain, tightness, burning sensation, or tingling, but does report knee pain. She has not had any falls. She reports no lightheadedness, dizziness, vision or hearing problems, speech issues, swallowing difficulties, tremors, or shakiness. She uses a walking stick for mobility. She has noticed a gradual decline in her memory. She is currently taking gabapentin 300 mg  up to3 times a day, which she believes may be helping with leg cramps and the nerve issues. She has been attending physical therapy for her gait for almost a year and recently switched to a new therapist.    She

## (undated) DEVICE — ADHESIVE SKIN CLOSURE TOP 36 CC HI VISC DERMBND MINI

## (undated) DEVICE — Device